# Patient Record
Sex: MALE | Race: BLACK OR AFRICAN AMERICAN | NOT HISPANIC OR LATINO | Employment: UNEMPLOYED | URBAN - NONMETROPOLITAN AREA
[De-identification: names, ages, dates, MRNs, and addresses within clinical notes are randomized per-mention and may not be internally consistent; named-entity substitution may affect disease eponyms.]

---

## 2018-06-21 ENCOUNTER — HISTORICAL (OUTPATIENT)
Dept: ADMINISTRATIVE | Facility: HOSPITAL | Age: 38
End: 2018-06-21

## 2021-05-20 LAB
ALBUMIN SERPL-MCNC: 4.5 G/DL (ref 3.4–5)
ALBUMIN/GLOB SERPL: 1.6 {RATIO}
ALP SERPL-CCNC: 121 U/L (ref 50–144)
ALT SERPL-CCNC: 66 U/L (ref 1–45)
ANION GAP SERPL CALC-SCNC: 11 MMOL/L (ref 7–16)
AST SERPL-CCNC: 40 U/L (ref 17–59)
BILIRUB SERPL-MCNC: 0.47 MG/DL (ref 0.1–1)
BUN SERPL-MCNC: 17 MG/DL (ref 7–20)
CALCIUM SERPL-MCNC: 9.6 MG/DL (ref 8.4–10.2)
CHLORIDE SERPL-SCNC: 106 MMOL/L (ref 94–110)
CO2 SERPL-SCNC: 25 MMOL/L (ref 21–32)
CREAT SERPL-MCNC: 0.9 MG/DL (ref 0.66–1.25)
CREAT/UREA NIT SERPL: 18.9 (ref 12–20)
EST. AVERAGE GLUCOSE BLD GHB EST-MCNC: 189 MG/DL (ref 70–115)
GLOBULIN SER-MCNC: 2.9 G/DL (ref 2–3.9)
GLUCOSE SERPL-MCNC: 131 MGM./DL (ref 70–115)
HBA1C MFR BLD: 8.3 % (ref 4–6)
POTASSIUM SERPL-SCNC: 4.1 MMOL/L (ref 3.5–5.1)
PROT SERPL-MCNC: 7.4 G/DL (ref 6.3–8.2)
SODIUM SERPL-SCNC: 142 MMOL/L (ref 135–145)

## 2021-05-24 LAB
CREAT UR-MCNC: 120.8 MG/DL
MICROALBUMIN/CREAT RATIO PNL UR: <10 (ref 0–29)

## 2021-08-18 LAB
ALBUMIN SERPL-MCNC: 4.8 G/DL (ref 3.4–5)
ALBUMIN/GLOB SERPL: 1.6 {RATIO}
ALP SERPL-CCNC: 141 U/L (ref 50–144)
ALT SERPL-CCNC: 84 U/L (ref 1–45)
ANION GAP SERPL CALC-SCNC: 12 MMOL/L (ref 7–16)
AST SERPL-CCNC: 45 U/L (ref 17–59)
BASOPHILS # BLD AUTO: 0.06 X10(3)/MCL (ref 0.01–0.08)
BASOPHILS NFR BLD AUTO: 0.8 % (ref 0.1–1.2)
BILIRUB SERPL-MCNC: 0.38 MG/DL (ref 0.1–1)
BUN SERPL-MCNC: 15 MG/DL (ref 7–20)
CALCIUM SERPL-MCNC: 10 MG/DL (ref 8.4–10.2)
CHLORIDE SERPL-SCNC: 108 MMOL/L (ref 94–110)
CHOLEST SERPL-MCNC: 143 MG/DL (ref 0–200)
CO2 SERPL-SCNC: 20 MMOL/L (ref 21–32)
CREAT SERPL-MCNC: 1.1 MG/DL (ref 0.66–1.25)
CREAT/UREA NIT SERPL: 13.6 (ref 12–20)
EOSINOPHIL # BLD AUTO: 0.16 X10(3)/MCL (ref 0.04–0.54)
EOSINOPHIL NFR BLD AUTO: 2.1 % (ref 0.7–7)
ERYTHROCYTE [DISTWIDTH] IN BLOOD BY AUTOMATED COUNT: 12.5 % (ref 11.6–14.4)
EST. AVERAGE GLUCOSE BLD GHB EST-MCNC: 159 MG/DL (ref 70–115)
GLOBULIN SER-MCNC: 3 G/DL (ref 2–3.9)
GLUCOSE SERPL-MCNC: 163 MGM./DL (ref 70–115)
HAV IGM SERPL QL IA: NONREACTIVE
HBA1C MFR BLD: 7.3 % (ref 4–6)
HBV CORE IGM SERPL QL IA: NONREACTIVE
HBV SURFACE AG SERPL QL IA: NONREACTIVE
HCT VFR BLD AUTO: 46.2 % (ref 36–52)
HCV AB SERPL QL IA: NONREACTIVE
HDLC SERPL-MCNC: 36 MG/DL (ref 40–60)
HGB BLD-MCNC: 15.3 G/DL (ref 13–18)
IMM GRANULOCYTES # BLD AUTO: 0.03 X10E3/UL (ref 0–0.03)
IMM GRANULOCYTES NFR BLD AUTO: 0.4 % (ref 0–0.5)
LDLC SERPL CALC-MCNC: 73.2 MG/DL (ref 30–100)
LYMPHOCYTES # BLD AUTO: 3.13 X10(3)/MCL (ref 1.32–3.57)
LYMPHOCYTES NFR BLD AUTO: 40.9 % (ref 20–55)
MCH RBC QN AUTO: 28.8 PG (ref 27–34)
MCHC RBC AUTO-ENTMCNC: 33.1 G/DL (ref 31–37)
MCV RBC AUTO: 87 FL (ref 79–99)
MONOCYTES # BLD AUTO: 0.61 X10(3)/MCL (ref 0.3–0.82)
MONOCYTES NFR BLD AUTO: 8 % (ref 4.7–12.5)
NEUTROPHILS # BLD AUTO: 3.67 X10(3)/MCL (ref 1.78–5.38)
NEUTROPHILS NFR BLD AUTO: 47.8 % (ref 37–73)
PLATELET # BLD AUTO: 264 X10(3)/MCL (ref 140–371)
PMV BLD AUTO: 11.3 FL (ref 9.4–12.4)
POTASSIUM SERPL-SCNC: 3.9 MMOL/L (ref 3.5–5.1)
PROT SERPL-MCNC: 7.8 G/DL (ref 6.3–8.2)
RBC # BLD AUTO: 5.31 X10(6)/MCL (ref 4–6)
SODIUM SERPL-SCNC: 140 MMOL/L (ref 135–145)
TRIGL SERPL-MCNC: 163 MG/DL (ref 30–200)
TSH SERPL-ACNC: 2.69 UIU/ML (ref 0.36–3.74)
WBC # SPEC AUTO: 7.7 X10(3)/MCL (ref 4–11.5)

## 2021-11-08 LAB
ALBUMIN SERPL-MCNC: 4.2 G/DL (ref 3.4–5)
ALBUMIN/GLOB SERPL: 1.4 {RATIO}
ALP SERPL-CCNC: 109 U/L (ref 50–144)
ALT SERPL-CCNC: 80 U/L (ref 1–45)
ANION GAP SERPL CALC-SCNC: 10 MMOL/L (ref 7–16)
AST SERPL-CCNC: 47 U/L (ref 17–59)
BILIRUB SERPL-MCNC: 0.4 MG/DL (ref 0–1)
BUN SERPL-MCNC: 14 MG/DL (ref 7–20)
CALCIUM SERPL-MCNC: 9.3 MG/DL (ref 8.4–10.2)
CHLORIDE SERPL-SCNC: 106 MMOL/L (ref 94–110)
CO2 SERPL-SCNC: 26 MMOL/L (ref 21–32)
CREAT SERPL-MCNC: 1.04 MG/DL (ref 0.66–1.25)
CREAT/UREA NIT SERPL: 13.5 (ref 12–20)
EST. AVERAGE GLUCOSE BLD GHB EST-MCNC: 168 MG/DL (ref 70–115)
GLOBULIN SER-MCNC: 2.9 G/DL (ref 2–3.9)
GLUCOSE SERPL-MCNC: 108 MGM./DL (ref 70–115)
HBA1C MFR BLD: 7.6 % (ref 4–6)
POTASSIUM SERPL-SCNC: 4 MMOL/L (ref 3.5–5.1)
PROT SERPL-MCNC: 7.1 G/DL (ref 6.3–8.2)
SODIUM SERPL-SCNC: 142 MMOL/L (ref 135–145)

## 2022-02-08 LAB
LEFT EYE DM RETINOPATHY: NEGATIVE
RIGHT EYE DM RETINOPATHY: NEGATIVE

## 2022-04-10 ENCOUNTER — HISTORICAL (OUTPATIENT)
Dept: ADMINISTRATIVE | Facility: HOSPITAL | Age: 42
End: 2022-04-10

## 2022-04-11 ENCOUNTER — HISTORICAL (OUTPATIENT)
Dept: ADMINISTRATIVE | Facility: HOSPITAL | Age: 42
End: 2022-04-11

## 2022-04-28 VITALS
OXYGEN SATURATION: 98 % | BODY MASS INDEX: 36.13 KG/M2 | SYSTOLIC BLOOD PRESSURE: 118 MMHG | DIASTOLIC BLOOD PRESSURE: 88 MMHG | WEIGHT: 230.19 LBS | HEIGHT: 67 IN

## 2022-04-28 VITALS
OXYGEN SATURATION: 98 % | WEIGHT: 230.19 LBS | DIASTOLIC BLOOD PRESSURE: 88 MMHG | SYSTOLIC BLOOD PRESSURE: 118 MMHG | BODY MASS INDEX: 36.13 KG/M2 | HEIGHT: 67 IN

## 2022-05-03 ENCOUNTER — HISTORICAL (OUTPATIENT)
Dept: ADMINISTRATIVE | Facility: HOSPITAL | Age: 42
End: 2022-05-03

## 2022-05-03 NOTE — HISTORICAL [facility]ER
This is a historical note converted from Eric. Formatting and pictures may have been removed.  Please reference Eric for original formatting and attached multimedia. Chief Complaint  med refills and f/u  History of Present Illness  40 year old  male patient of June Leone?with PMH type 2 diabetes, htn, migraines, back pain, high cholesterol here for follow up on lab work from Dec 2020 and refills of all medications.  Hypertension:?chronic intermittent?headaches?,?no?chest pain,?no?shortness of breath,?no?dizziness,?no?light headedness,?nopalpitations,?noedema, ??no?calf pain with exertion. Lifestyle:??not exercising regularly?,??not limiting salt intake  Medications:?taking medications as directed, ?no?side effects from medication, checks blood pressure at home . Patient reports bp at home sometimes runs 150s/90s.  Type?II?diabetes: home blood sugar range high ?150 fasting,?seeing eye doctor regularly?([facility]),?checking feet regularly,?not taking aspirin daily ,?taking statin, ?not missing doses of medications,?no side effects from medications,. Complications:?no kidney disease,?no diabetic retinopathy, no diabetic neuropathy,?no peripheral vascular disease,?no hypertension,?no hyperlipidemia,?no coronary artery disease.  Associated symptoms:?no change in weight,?no dizziness,?no sweats,?chronic intermittent?headaches,no confusion,?no increased thirst,?no increased appetite,?no increased urination,?no blurred vision, no numbness of feet,?no calluses on feet.?  Compliance:?compliant?with medications,?compliant?with follow-up visits,?noncompliant?with diet, compliant? with home glucose monitoring.?  ?  Patient reports migraines stable on topamax and prn nabumetome.? Denies worsening of headaches, increased frequency, waking in the middle of the night with migraines, changes in vision.  Review of Systems  General  Constitutional:?no fever,?no significant change in weight,?no  fatigue,?no night sweats, no exercise intolerance  ?  Skin  Skin:?no rash,?no skin lesions?,?no abnormal mole  ?  ENMT  Eyes:?no vision changes?,?no dry eyes,?no irritation,?no eye pain  Ears:?no difficulty hearing,?no ear pain,no ear discharge,  Nose:?no nose/sinus problems?,?no frequent nosebleeds,?no nasal congestion/discharge  Mouth/Throat:?no sore throat,?no bleeding gums,?no snoring,?no dry mouth,?no mouth ulcers,?no oral abnormalities,?no teeth problems  ?  Cardiovascular  Cardiovascular:?no chest pain,?no palpitations,?no edema?,?no shortness of breath when walking,no shortness of breath when lying down,?no light-headedness on standing,?no known heart murmur  ?  Respiratory  Respiratory:?no cough,?no wheezing,?no dyspnea?  ?  Gastrointestinal  GI:?no abdominal pain,?no nausea,?no vomiting,?no diarrhea,?no blood in stool,?no constipation?  ?  Genitourinary  Genitourinary:?no difficulty urinating,?no pain during urination (dysuria),??no increased frequency,no nocturia,?no urgency,?no incontinence  ?  Musculoskeletal  Musculoskeletal:?no muscle weakness,?no arthralgias/joint pain,?no muscle aches?  ?   Neurologic  Neurologic:?headaches,?no dizziness?,?no numbness,?no tingling (paresthesia)  ?  Endocrine  Endocrine:no temperature intolerance,?normal drinking,?no polyuria,  ?   Psychiatric  Psych:?no anxiety,?no depression,?no trouble sleeping  Physical Exam  Vitals & Measurements  T:?37? ?C (Temporal Artery)? HR:?88(Peripheral)? BP:?128/70? SpO2:?98%?  HT:?170.20?cm? WT:?103.300?kg? BMI:?35.66?  Constitutional  General Appearance:?well developed,?obese?  Level of Distress:?in no acute distress.  Ambulation:?ambulating normally?  ?   Psychiatric  Insight:?good judgement  Mental Status:?active and alert,?flat affect,?normal mood,  Orientation:?oriented to person, place and time  Memory:?recent memory?intact, remote memory?intact  ?  Head:?normocephalic?atraumatic  ?  Eyes  Lids and Conjunctivae:?non-injected,?no  discharge, ??no pallor,,  Pupils:?PERRLA  Sclerae:?non-icteric,?non-injected  ?  ENMT  Ears: ?no?lesions on external ear,?EACs clear?,?TMs clear w good landmarks,?  Hearing:?Conversation hearing appropriate  Nose:?no?lesions on external nose,?nares patent,?no sinus tenderness,?no nasal discharge  Lips, Teeth, and Gums: ?no?mouth or lip ulcers,?no bleeding gums,?normal dentition,?no?gingival erythema.  Oropharynx:?moist mucous membranes,?noerythema,?no?exudates,??+1 tonsillar enlargement.  ?   Neck:  Neck: supple,?? trachea midline  Lymph:?no?lymphadenopathy  Thyroid:no enlargement,?non-tender,?no nodules.  ?   Lungs  Respiratory effort:?no dyspnea,?  Auscultation:?breath sounds normal,?good air movement,? ?clear to auscultation bilaterally.  ?   Cardiovascular  Heart Auscultation:?regular rate,?regular?rhythm,  Pulses:?strong / equal  ?  Abdomen  Bowel Sounds:?normal  Inspection and Palpation:?soft,?non-distended,?no?tenderness,?  ?  Musculoskeletal:  Joints, Bones, and Muscles:?normal movement of all extremities,??  Extremities:no cyanosis,??no edema,?no varicosities , no palpable cord.  ?  Skin  Inspection and palpation:?no rash, good turgor, no lesion,?no jaundice  Nails:?normal  ?  Neurologic  Gait and Station:?normal gait,?normal station  Cranial Nerves:?grossly intact  Sensation:grossly intact,?  ?  ?  Assessment/Plan  1.?Diabetes mellitus ? E11.9  ?5/20/2020 hemoglobin A1c 7.1...12/8/20 hba1c 7.5 , patient currently taking?Jardiance 10 mg daily, Lantus 18 units?daily and Metformin?ER?500 mg daily.? Increase Jardiance to 25 mg daily?for?diabetes control as well as reports of elevated BP at home. ?Add baby aspirin?daily. ?Educated patient on need to monitor glucose and notify office of any low sugars below 80?as well?as importance of remaining well-hydrated. ?Patient states understanding. ?Obtain lab work in 3 months?and then follow-up afterwards.  2.?Hyperlipidemia ? E78.5  5/22/2020 cholesterol 120 HDL 32,  LDL 51, triglycerides 184, LFT WNL.? 12/8/2020 cholesterol 118, HDL 35, triglyceride 118, LDL 55, AST WNL, ALT elevated 63.? Patient currently taking atorvastatin 40 mg daily  3.?Hypertensive disorder ? I10  ?BP?within normal limits today,?patient currently take metoprolol 50 mg?daily.? Patient does report elevated BPs at home?of 150s/90s.? Will increase Jardiance?and monitor  4.?Influenza vaccination given ? Z23  5.?Elevated liver enzymes ? R74.8  ?5/22/2020?LFT WNL. ?12/8/2020?AST WNL, ALT?elevated 63.??Discussed low-fat diet. ?Repeat level drawn in office today. ?Will notify patient of results next week. ?If remains elevated will add?hepatitis panel and liver ultrasound?to upcoming?orders.  6.?Migraine ? G43.909  Stable on Topamax 50 mg?at bedtime and as needed?nabumetone.  Orders:  aspirin, 81 mg = 1 tab(s), Oral, Daily, # 30 tab(s), 5 Refill(s), Pharmacy: [facility], 170.2, cm, Height/Length Dosing, 02/05/21 9:49:00 CST, 103.3, kg, Weight Dosing, 02/05/21 9:49:00 CST  atorvastatin, 40 mg = 1 tab(s), Oral, qPM, # 30 tab(s), 5 Refill(s), Pharmacy: [facility], 170.2, cm, Height/Length Dosing, 02/05/21 9:49:00 CST, 103.3, kg, Weight Dosing, 02/05/21 9:49:00 CST  cyclobenzaprine, 7.5 mg = 1 tab(s), Oral, q8hr, PRN PRN for spasm, # 30 tab(s), 1 Refill(s), Pharmacy: [facility], 170.2, cm, Height/Length Dosing, 02/05/21 9:49:00 CST, 103.3, kg, Weight Dosing, 02/05/21 9:49:00 CST  empagliflozin, 25 mg = 1 tab(s), Oral, qAM, # 30 tab(s), 5 Refill(s), Pharmacy: [facility], 170.2, cm, Height/Length Dosing, 02/05/21 9:49:00 CST, 103.3, kg, Weight Dosing, 02/05/21 9:49:00 CST  insulin glargine, 18 units, Subcutaneous, Daily, # 6 mL, 5 Refill(s), Pharmacy: [facility], 170.2, cm, Height/Length Dosing, 02/05/21 9:49:00 CST, 103.3, kg, Weight Dosing, 02/05/21 9:49:00 CST  metFORMIN, 500 mg = 1 tab(s), Oral, Daily, # 30 tab(s), 5 Refill(s), Pharmacy: [facility], 170.2, cm, Height/Length  Dosing, 02/05/21 9:49:00 CST, 103.3, kg, Weight Dosing, 02/05/21 9:49:00 CST  metoprolol, 50 mg = 1 tab(s), Oral, Daily, # 30 tab(s), 5 Refill(s), Pharmacy: [facility], 170.2, cm, Height/Length Dosing, 02/05/21 9:49:00 CST, 103.3, kg, Weight Dosing, 02/05/21 9:49:00 CST  nabumetone, 750 mg = 1 tab(s), Oral, BID, # 60 tab(s), 2 Refill(s), Pharmacy: [facility], 170.2, cm, Height/Length Dosing, 02/05/21 9:49:00 CST, 103.3, kg, Weight Dosing, 02/05/21 9:49:00 CST  topiramate, 50 mg = 1 tab(s), Oral, qPM, # 30 tab(s), 5 Refill(s), Pharmacy: [facility], 170.2, cm, Height/Length Dosing, 02/05/21 9:49:00 CST, 103.3, kg, Weight Dosing, 02/05/21 9:49:00 CST  Clinic Follow Up Established 15, *Est. 05/09/21 3:00:00 CDT, Order for future visit, Diabetes mellitus  Hyperlipidemia  Hypertensive disorder  [facility]  Clinic Follow-Up Lab Draw, *Est. 05/09/21 3:00:00 CDT, Order for future visit, Diabetes mellitus  Hyperlipidemia  Hypertensive disorder  Elevated liver enzymes, [facility]  Office/Outpatient Visit Level 4 Established [id number] PC, Diabetes mellitus  Hyperlipidemia  Hypertensive disorder  Influenza vaccination given  Elevated liver enzymes  Migraine, [facility], 02/05/21 10:29:00 CST  Referrals  Clinic Follow up, Order for future visit  Clinic Follow Up Established 15, *Est. 05/09/21 3:00:00 CDT, Order for future visit, Diabetes mellitus  Hyperlipidemia  Hypertensive disorder  Elevated liver enzymes, [facility]  Clinic Follow-Up Lab Draw, *Est. 05/09/21 3:00:00 CDT, Order for future visit, Diabetes mellitus  Hyperlipidemia  Hypertensive disorder  Elevated liver enzymes, [facility]   Problem List/Past Medical History  Ongoing  Diabetes mellitus  Elevated liver enzymes  Hyperlipidemia  Hypertensive disorder  Migraine  Historical  Chest pain  GERD -  Gastro-esophageal reflux disease  Hypercholesterolaemia  Medications  aspirin 81 mg oral Delayed Release (EC) tablet, 81 mg= 1 tab(s), Oral, Daily, 5 refills  atorvastatin 40 mg oral tablet, 40 mg= 1 tab(s), Oral, qPM, 5 refills  cyclobenzaprine 7.5 mg oral tablet, 7.5 mg= 1 tab(s), Oral, q8hr, PRN, 1 refills  Jardiance 25 mg oral tablet, 25 mg= 1 tab(s), Oral, qAM, 5 refills  Lantus Solostar Pen 100 units/mL subcutaneous solution, 18 units, Subcutaneous, Daily, 5 refills  metFORMIN 500 mg oral tablet, extended release, 500 mg= 1 tab(s), Oral, Daily, 5 refills  metoprolol succinate 50 mg oral tablet extended release, 50 mg= 1 tab(s), Oral, Daily, 5 refills  nabumetone 750 mg oral tablet, 750 mg= 1 tab(s), Oral, BID, 2 refills  topiramate 50 mg oral tablet, 50 mg= 1 tab(s), Oral, qPM, 5 refills  Allergies  No Known Medication Allergies  Social History  Abuse/Neglect  No, 12/16/2020  Alcohol  Current, Liquor, 1-2 times per month, Alcohol use interferes with work or home: No. Others hurt by drinking: No. Household alcohol concerns: No., 02/05/2021  Employment/School  Highest education level: High school., 01/02/2020  Exercise  Exercise duration: 0., 02/05/2021  Home/Environment  Lives with Mother., 02/05/2021  Nutrition/Health  Regular, 02/05/2021  Substance Use  Never, 02/05/2021  Tobacco  5-9 cigarettes (between 1/4 to 1/2 pack)/day in last 30 days, Cigars, No, Household tobacco concerns: No. Smokeless Tobacco Use: Never. 14 Years (Age started)., 02/05/2021  Family History  Cerebrovascular accident: Sister.  Hyperlipidemia.: Father.  Hypertension.: Mother and Father.  Immunizations  Vaccine Date Status   influenza virus vaccine, inactivated 02/05/2021 Given   Health Maintenance  Health Maintenance  ???Pending?(in the next year)  ??? ??OverDue  ??? ? ? ?Influenza Vaccine due??10/01/20??and every 1??day(s)  ??? ? ? ?Diabetes Maintenance-HgbA1c due??10/08/20??and every 1??year(s)  ??? ? ? ?Hypertension Management-BMP  due??10/08/20??and every 1??year(s)  ??? ? ? ?Smoking Cessation due??01/01/21??Variable frequency  ??? ??Due?  ??? ? ? ?Alcohol Misuse Screening due??01/02/21??and every 1??year(s)  ??? ? ? ?ADL Screening due??02/05/21??and every 1??year(s)  ??? ? ? ?Diabetes Maintenance-Serum Creatinine due??02/05/21??Variable frequency  ??? ? ? ?Diabetes Maintenance-Fasting Lipid Profile due??02/05/21??Variable frequency  ??? ? ? ?Diabetes Maintenance-Eye Exam due??02/05/21??Unknown Frequency  ??? ? ? ?Diabetes Maintenance-Foot Exam due??02/05/21??Unknown Frequency  ??? ? ? ?Diabetes Maintenance-Microalbumin due??02/05/21??Unknown Frequency  ??? ? ? ?Hypertension Management-Education due??02/05/21??and every 1??year(s)  ??? ? ? ?Tetanus Vaccine due??02/05/21??and every 10??year(s)  ??? ??Due In Future?  ??? ? ? ?Obesity Screening not due until??01/01/22??and every 1??year(s)  ???Satisfied?(in the past 1 year)  ??? ??Satisfied?  ??? ? ? ?Blood Pressure Screening on??02/05/21.??Satisfied by Desai LPN, Piquela P.  ??? ? ? ?Body Mass Index Check on??02/05/21.??Satisfied by Desai LPN, Piquela P.  ??? ? ? ?Depression Screening on??02/05/21.??Satisfied by Desai LPN, Piquela P.  ??? ? ? ?Hypertension Management-Blood Pressure on??02/05/21.??Satisfied by Desai LPN, Piquela P.  ??? ? ? ?Influenza Vaccine on??02/05/21.??Satisfied by Desai LPN, Piquela P.  ??? ? ? ?Obesity Screening on??02/05/21.??Satisfied by Coleen Desai LPN  ?

## 2023-02-17 RX ORDER — NABUMETONE 750 MG/1
750 TABLET, FILM COATED ORAL 2 TIMES DAILY
COMMUNITY
End: 2023-05-19 | Stop reason: SDUPTHER

## 2023-02-17 RX ORDER — METFORMIN HYDROCHLORIDE 500 MG/1
500 TABLET ORAL 2 TIMES DAILY WITH MEALS
COMMUNITY
End: 2023-02-22 | Stop reason: DRUGHIGH

## 2023-02-17 RX ORDER — BLOOD SUGAR DIAGNOSTIC
1 STRIP MISCELLANEOUS
COMMUNITY
End: 2023-04-24 | Stop reason: SDUPTHER

## 2023-02-17 RX ORDER — CYCLOBENZAPRINE HYDROCHLORIDE 7.5 MG/1
10 TABLET, FILM COATED ORAL 3 TIMES DAILY PRN
COMMUNITY
End: 2024-02-14 | Stop reason: SDUPTHER

## 2023-02-17 RX ORDER — DULAGLUTIDE 1.5 MG/.5ML
1.5 INJECTION, SOLUTION SUBCUTANEOUS
COMMUNITY
End: 2023-02-22 | Stop reason: SDUPTHER

## 2023-02-17 RX ORDER — ASPIRIN 81 MG/1
81 TABLET ORAL DAILY
COMMUNITY
End: 2023-05-19

## 2023-02-17 RX ORDER — INSULIN GLARGINE 100 [IU]/ML
24 INJECTION, SOLUTION SUBCUTANEOUS DAILY
COMMUNITY
End: 2023-03-02 | Stop reason: SDUPTHER

## 2023-02-17 RX ORDER — TOPIRAMATE 50 MG/1
50 TABLET, FILM COATED ORAL DAILY
COMMUNITY
End: 2023-02-22 | Stop reason: SDUPTHER

## 2023-02-17 RX ORDER — METOPROLOL SUCCINATE 50 MG/1
50 TABLET, EXTENDED RELEASE ORAL DAILY
COMMUNITY
End: 2023-02-22 | Stop reason: SDUPTHER

## 2023-02-17 RX ORDER — ATORVASTATIN CALCIUM 40 MG/1
40 TABLET, FILM COATED ORAL DAILY
COMMUNITY
End: 2023-02-22 | Stop reason: SDUPTHER

## 2023-02-22 ENCOUNTER — OFFICE VISIT (OUTPATIENT)
Dept: FAMILY MEDICINE | Facility: CLINIC | Age: 43
End: 2023-02-22
Payer: MEDICAID

## 2023-02-22 VITALS
TEMPERATURE: 98 F | OXYGEN SATURATION: 99 % | DIASTOLIC BLOOD PRESSURE: 92 MMHG | SYSTOLIC BLOOD PRESSURE: 138 MMHG | HEIGHT: 67 IN | BODY MASS INDEX: 34.39 KG/M2 | WEIGHT: 219.13 LBS | HEART RATE: 100 BPM

## 2023-02-22 DIAGNOSIS — E11.9 TYPE 2 DIABETES MELLITUS WITHOUT COMPLICATION, WITH LONG-TERM CURRENT USE OF INSULIN: Primary | ICD-10-CM

## 2023-02-22 DIAGNOSIS — Z82.49 FAMILY HISTORY OF CARDIOVASCULAR DISEASE: ICD-10-CM

## 2023-02-22 DIAGNOSIS — R74.8 ELEVATED LIVER ENZYMES: ICD-10-CM

## 2023-02-22 DIAGNOSIS — M54.50 CHRONIC LOW BACK PAIN, UNSPECIFIED BACK PAIN LATERALITY, UNSPECIFIED WHETHER SCIATICA PRESENT: ICD-10-CM

## 2023-02-22 DIAGNOSIS — I10 PRIMARY HYPERTENSION: ICD-10-CM

## 2023-02-22 DIAGNOSIS — Z79.4 TYPE 2 DIABETES MELLITUS WITHOUT COMPLICATION, WITH LONG-TERM CURRENT USE OF INSULIN: Primary | ICD-10-CM

## 2023-02-22 DIAGNOSIS — E66.9 OBESITY, UNSPECIFIED CLASSIFICATION, UNSPECIFIED OBESITY TYPE, UNSPECIFIED WHETHER SERIOUS COMORBIDITY PRESENT: ICD-10-CM

## 2023-02-22 DIAGNOSIS — E78.5 HYPERLIPIDEMIA, UNSPECIFIED HYPERLIPIDEMIA TYPE: ICD-10-CM

## 2023-02-22 DIAGNOSIS — G89.29 CHRONIC LOW BACK PAIN, UNSPECIFIED BACK PAIN LATERALITY, UNSPECIFIED WHETHER SCIATICA PRESENT: ICD-10-CM

## 2023-02-22 DIAGNOSIS — G43.809 OTHER MIGRAINE WITHOUT STATUS MIGRAINOSUS, NOT INTRACTABLE: ICD-10-CM

## 2023-02-22 DIAGNOSIS — M79.604 PAIN OF RIGHT LOWER EXTREMITY: ICD-10-CM

## 2023-02-22 PROBLEM — G43.909 MIGRAINE HEADACHE: Status: ACTIVE | Noted: 2019-09-27

## 2023-02-22 LAB
ALBUMIN SERPL-MCNC: 4.8 G/DL (ref 3.4–5)
ALBUMIN/GLOB SERPL: 1.5 RATIO
ALP SERPL-CCNC: 137 UNIT/L (ref 50–144)
ALT SERPL-CCNC: 56 UNIT/L (ref 1–45)
ANION GAP SERPL CALC-SCNC: 10 MEQ/L (ref 2–13)
AST SERPL-CCNC: 40 UNIT/L (ref 17–59)
BASOPHILS # BLD AUTO: 0.07 X10(3)/MCL (ref 0.01–0.08)
BASOPHILS NFR BLD AUTO: 1 % (ref 0.1–1.2)
BILIRUBIN DIRECT+TOT PNL SERPL-MCNC: 0.3 MG/DL (ref 0–1)
BUN SERPL-MCNC: 15 MG/DL (ref 7–20)
CALCIUM SERPL-MCNC: 9.6 MG/DL (ref 8.4–10.2)
CHLORIDE SERPL-SCNC: 100 MMOL/L (ref 98–110)
CHOLEST SERPL-MCNC: 263 MG/DL (ref 0–200)
CO2 SERPL-SCNC: 27 MMOL/L (ref 21–32)
CREAT SERPL-MCNC: 0.91 MG/DL (ref 0.66–1.25)
CREAT/UREA NIT SERPL: 16 (ref 12–20)
EOSINOPHIL # BLD AUTO: 0.17 X10(3)/MCL (ref 0.04–0.54)
EOSINOPHIL NFR BLD AUTO: 2.3 % (ref 0.7–7)
ERYTHROCYTE [DISTWIDTH] IN BLOOD BY AUTOMATED COUNT: 12 % (ref 11.6–14.4)
EST. AVERAGE GLUCOSE BLD GHB EST-MCNC: 269 MG/DL (ref 70–115)
GFR SERPLBLD CREATININE-BSD FMLA CKD-EPI: >90 MLS/MIN/1.73/M2
GLOBULIN SER-MCNC: 3.3 GM/DL (ref 2–3.9)
GLUCOSE SERPL-MCNC: 371 MG/DL (ref 70–115)
HBA1C MFR BLD: 11 % (ref 4–6)
HCT VFR BLD AUTO: 49 % (ref 36–52)
HDLC SERPL-MCNC: 32 MG/DL (ref 40–60)
HGB BLD-MCNC: 16.6 G/DL (ref 13–18)
IMM GRANULOCYTES # BLD AUTO: 0.02 X10(3)/MCL (ref 0–0.03)
IMM GRANULOCYTES NFR BLD AUTO: 0.3 % (ref 0–0.5)
LDLC SERPL DIRECT ASSAY-SCNC: 95.6 MG/DL (ref 30–100)
LYMPHOCYTES # BLD AUTO: 2.62 X10(3)/MCL (ref 1.32–3.57)
LYMPHOCYTES NFR BLD AUTO: 35.8 % (ref 20–55)
MCH RBC QN AUTO: 28.8 PG (ref 27–34)
MCV RBC AUTO: 85.1 FL (ref 79–99)
MEAN CELL HEMOGLOBIN CONCENTRATION (OHS) G/DL: 33.9 G/DL (ref 31–37)
MONOCYTES # BLD AUTO: 0.62 X10(3)/MCL (ref 0.3–0.82)
MONOCYTES NFR BLD AUTO: 8.5 % (ref 4.7–12.5)
NEUTROPHILS # BLD AUTO: 3.81 X10(3)/MCL (ref 1.78–5.38)
NEUTROPHILS NFR BLD AUTO: 52.1 % (ref 37–73)
NRBC BLD AUTO-RTO: 0 % (ref 0–1)
PLATELET # BLD AUTO: 245 X10(3)/MCL (ref 140–371)
PMV BLD AUTO: 11.8 FL (ref 9.4–12.4)
POTASSIUM SERPL-SCNC: 4.8 MMOL/L (ref 3.5–5.1)
PROT SERPL-MCNC: 8.1 GM/DL (ref 6.3–8.2)
RBC # BLD AUTO: 5.76 X10(6)/MCL (ref 4–6)
SODIUM SERPL-SCNC: 137 MMOL/L (ref 135–145)
TRIGL SERPL-MCNC: 1069 MG/DL (ref 30–200)
TSH SERPL-ACNC: 2.18 UIU/ML (ref 0.36–3.74)
WBC # SPEC AUTO: 7.3 X10(3)/MCL (ref 4–11.5)

## 2023-02-22 PROCEDURE — 1159F MED LIST DOCD IN RCRD: CPT | Mod: CPTII,,, | Performed by: NURSE PRACTITIONER

## 2023-02-22 PROCEDURE — 4010F ACE/ARB THERAPY RXD/TAKEN: CPT | Mod: CPTII,,, | Performed by: NURSE PRACTITIONER

## 2023-02-22 PROCEDURE — 84443 ASSAY THYROID STIM HORMONE: CPT | Performed by: NURSE PRACTITIONER

## 2023-02-22 PROCEDURE — 1160F RVW MEDS BY RX/DR IN RCRD: CPT | Mod: CPTII,,, | Performed by: NURSE PRACTITIONER

## 2023-02-22 PROCEDURE — 3008F BODY MASS INDEX DOCD: CPT | Mod: CPTII,,, | Performed by: NURSE PRACTITIONER

## 2023-02-22 PROCEDURE — 3075F SYST BP GE 130 - 139MM HG: CPT | Mod: CPTII,,, | Performed by: NURSE PRACTITIONER

## 2023-02-22 PROCEDURE — 3075F PR MOST RECENT SYSTOLIC BLOOD PRESS GE 130-139MM HG: ICD-10-PCS | Mod: CPTII,,, | Performed by: NURSE PRACTITIONER

## 2023-02-22 PROCEDURE — 80061 LIPID PANEL: CPT | Performed by: NURSE PRACTITIONER

## 2023-02-22 PROCEDURE — 99214 PR OFFICE/OUTPT VISIT, EST, LEVL IV, 30-39 MIN: ICD-10-PCS | Mod: ,,, | Performed by: NURSE PRACTITIONER

## 2023-02-22 PROCEDURE — 99214 OFFICE O/P EST MOD 30 MIN: CPT | Mod: ,,, | Performed by: NURSE PRACTITIONER

## 2023-02-22 PROCEDURE — 3080F DIAST BP >= 90 MM HG: CPT | Mod: CPTII,,, | Performed by: NURSE PRACTITIONER

## 2023-02-22 PROCEDURE — 3008F PR BODY MASS INDEX (BMI) DOCUMENTED: ICD-10-PCS | Mod: CPTII,,, | Performed by: NURSE PRACTITIONER

## 2023-02-22 PROCEDURE — 4010F PR ACE/ARB THEARPY RXD/TAKEN: ICD-10-PCS | Mod: CPTII,,, | Performed by: NURSE PRACTITIONER

## 2023-02-22 PROCEDURE — 83036 HEMOGLOBIN GLYCOSYLATED A1C: CPT | Performed by: NURSE PRACTITIONER

## 2023-02-22 PROCEDURE — 85025 COMPLETE CBC W/AUTO DIFF WBC: CPT | Performed by: NURSE PRACTITIONER

## 2023-02-22 PROCEDURE — 1160F PR REVIEW ALL MEDS BY PRESCRIBER/CLIN PHARMACIST DOCUMENTED: ICD-10-PCS | Mod: CPTII,,, | Performed by: NURSE PRACTITIONER

## 2023-02-22 PROCEDURE — 1159F PR MEDICATION LIST DOCUMENTED IN MEDICAL RECORD: ICD-10-PCS | Mod: CPTII,,, | Performed by: NURSE PRACTITIONER

## 2023-02-22 PROCEDURE — 3080F PR MOST RECENT DIASTOLIC BLOOD PRESSURE >= 90 MM HG: ICD-10-PCS | Mod: CPTII,,, | Performed by: NURSE PRACTITIONER

## 2023-02-22 PROCEDURE — 80053 COMPREHEN METABOLIC PANEL: CPT | Performed by: NURSE PRACTITIONER

## 2023-02-22 RX ORDER — IRBESARTAN 150 MG/1
150 TABLET ORAL NIGHTLY
Qty: 90 TABLET | Refills: 0 | Status: SHIPPED | OUTPATIENT
Start: 2023-02-22 | End: 2023-05-30 | Stop reason: SDUPTHER

## 2023-02-22 RX ORDER — ATORVASTATIN CALCIUM 40 MG/1
40 TABLET, FILM COATED ORAL DAILY
Qty: 90 TABLET | Refills: 0 | Status: SHIPPED | OUTPATIENT
Start: 2023-02-22 | End: 2023-05-30 | Stop reason: SDUPTHER

## 2023-02-22 RX ORDER — METOPROLOL SUCCINATE 50 MG/1
50 TABLET, EXTENDED RELEASE ORAL DAILY
Qty: 90 TABLET | Refills: 0 | Status: SHIPPED | OUTPATIENT
Start: 2023-02-22 | End: 2023-05-30 | Stop reason: SDUPTHER

## 2023-02-22 RX ORDER — METFORMIN HYDROCHLORIDE 500 MG/1
1000 TABLET, EXTENDED RELEASE ORAL NIGHTLY
Qty: 180 TABLET | Refills: 0 | Status: SHIPPED | OUTPATIENT
Start: 2023-02-22 | End: 2023-05-30 | Stop reason: SDUPTHER

## 2023-02-22 RX ORDER — TOPIRAMATE 50 MG/1
50 TABLET, FILM COATED ORAL DAILY
Qty: 90 TABLET | Refills: 0 | Status: SHIPPED | OUTPATIENT
Start: 2023-02-22 | End: 2023-05-30 | Stop reason: SDUPTHER

## 2023-02-22 RX ORDER — DULAGLUTIDE 1.5 MG/.5ML
1.5 INJECTION, SOLUTION SUBCUTANEOUS
Qty: 12 PEN | Refills: 0 | Status: SHIPPED | OUTPATIENT
Start: 2023-02-22 | End: 2023-04-14 | Stop reason: SDUPTHER

## 2023-02-22 NOTE — PROGRESS NOTES
Patient ID: [id number]     Chief Complaint: lower back, r hip, l arm pain      HPI:     Ysabel Valerio is a 42 y.o. male here today for c/o low back, right hip, and left arm pain.  He's been lost to follow up since 21.  He states that his mother  over a year ago and he has been off of his routine medications for a while now.  He is not checking his blood sugar or blood pressure at home.  He does not follow any specific diet.  Pain he is having in his lower back, left arm, right leg have been present for years but he states getting worse.  He was referred to PT twice but never received a phone call to schedule.  He has had an EMG many years ago while living in [place].  He can't recall results. He does report having a murmur and that he saw a cardiologist when he lived in [place].  He does state that he has chest pain and has had chest pain since he was a child.  He reports that his sister had a stroke before the age of 50.    Past Medical History:  has a past medical history of Chest pain, Diabetes mellitus, type 2, Elevated liver enzymes, GERD (gastroesophageal reflux disease), Hyperlipidemia, Hypertension, Low back pain, Migraine, Obesity, unspecified, and Right leg pain.    Surgical History:  has no past surgical history on file.    Family History: family history includes Hyperlipidemia in his father; Hypertension in his father and mother; Stroke in his sister.    Social History:  reports that he has been smoking cigarettes. He has a 3.75 pack-year smoking history. He has been exposed to tobacco smoke. He has never used smokeless tobacco. He reports current alcohol use. He reports that he does not use drugs.    Current Outpatient Medications   Medication Instructions    aspirin (ECOTRIN) 81 mg, Oral, Daily    atorvastatin (LIPITOR) 40 mg, Oral, Daily    blood sugar diagnostic Strp 1 each, Misc.(Non-Drug; Combo Route), 2 times daily    cyclobenzaprine (FEXMID) 10 mg, Oral, 3 times daily PRN     "empagliflozin (JARDIANCE) 25 mg, Oral, Daily    insulin 24 Units, Subcutaneous, Daily    irbesartan (AVAPRO) 150 mg, Oral, Nightly    metFORMIN (GLUCOPHAGE-XR) 1,000 mg, Oral, Nightly    metoprolol succinate (TOPROL-XL) 50 mg, Oral, Daily    nabumetone (RELAFEN) 750 mg, Oral, 2 times daily    pen needle, diabetic 32 gauge x 3/16" Ndle 1 each, Misc.(Non-Drug; Combo Route)    topiramate (TOPAMAX) 50 mg, Oral, Daily    TRULICITY 1.5 mg, Subcutaneous, Every 7 days       Patient has No Known Allergies.     Patient Care Team:  SUNDEEP Salazar as PCP - General (Family Medicine)       Subjective:     Review of Systems    See HPI     Objective:     Visit Vitals  BP (!) 138/92 (BP Location: Left arm, Patient Position: Sitting)   Pulse 100   Temp 98.4 °F (36.9 °C) (Temporal)   Ht 5' 7.01" (1.702 m)   Wt 99.4 kg (219 lb 2.2 oz)   SpO2 99%   BMI 34.31 kg/m²       Physical Exam  Constitutional:       Appearance: He is obese.   HENT:      Head: Normocephalic.      Nose: Nose normal.      Mouth/Throat:      Mouth: Mucous membranes are moist.      Pharynx: Oropharynx is clear.   Cardiovascular:      Rate and Rhythm: Normal rate and regular rhythm.      Pulses: Normal pulses.      Heart sounds: Normal heart sounds.   Pulmonary:      Effort: Pulmonary effort is normal.      Breath sounds: Normal breath sounds.   Musculoskeletal:      Right lower leg: No edema.      Left lower leg: No edema.   Skin:     General: Skin is warm and dry.   Neurological:      General: No focal deficit present.      Mental Status: He is alert. Mental status is at baseline.   Psychiatric:         Mood and Affect: Mood normal.         Behavior: Behavior normal.       Assessment:       ICD-10-CM ICD-9-CM   1. Type 2 diabetes mellitus without complication, with long-term current use of insulin  E11.9 250.00    Z79.4 V58.67   2. Primary hypertension  I10 401.9   3. Hyperlipidemia, unspecified hyperlipidemia type  E78.5 272.4   4. Other migraine without " status migrainosus, not intractable  G43.809 346.80   5. Obesity, unspecified classification, unspecified obesity type, unspecified whether serious comorbidity present  E66.9 278.00   6. Elevated liver enzymes  R74.8 790.5   7. Chronic low back pain, unspecified back pain laterality, unspecified whether sciatica present  M54.50 724.2    G89.29 338.29   8. Pain of right lower extremity  M79.604 729.5   9. Family history of cardiovascular disease  Z82.49 V17.49        Plan:     Get labs today, will call with updated POC.  Referring for PT  Referring to neurology for EMG  Referring to cardiology to establish care for numerous risk factors  Check labs again in 3 months and RTC for f/u pain and chronic diseases  Ok to restart the following medications:    Medications Ordered This Encounter   Medications    atorvastatin (LIPITOR) 40 MG tablet     Sig: Take 1 tablet (40 mg total) by mouth once daily.     Dispense:  90 tablet     Refill:  0    dulaglutide (TRULICITY) 1.5 mg/0.5 mL pen injector     Sig: Inject 1.5 mg into the skin every 7 days.     Dispense:  12 pen     Refill:  0    irbesartan (AVAPRO) 150 MG tablet     Sig: Take 1 tablet (150 mg total) by mouth every evening.     Dispense:  90 tablet     Refill:  0     .    metFORMIN (GLUCOPHAGE-XR) 500 MG ER 24hr tablet     Sig: Take 2 tablets (1,000 mg total) by mouth every evening.     Dispense:  180 tablet     Refill:  0    metoprolol succinate (TOPROL-XL) 50 MG 24 hr tablet     Sig: Take 1 tablet (50 mg total) by mouth once daily.     Dispense:  90 tablet     Refill:  0     .    topiramate (TOPAMAX) 50 MG tablet     Sig: Take 1 tablet (50 mg total) by mouth once daily.     Dispense:  90 tablet     Refill:  0        Follow up in about 3 months (around 5/22/2023) for Follow Up, Labs Prior. In addition to their scheduled follow up, the patient has also been instructed to follow up on as needed basis.     Future Appointments   Date Time Provider Department Center    5/11/2023  8:20 AM LAB, [facility] LABORATORY DRAW STATION [facility] MELANIE Brower   5/18/2023  1:30 PM SUNDEEP Salzaar [facility] SUNDEEP Mack

## 2023-03-02 ENCOUNTER — TELEPHONE (OUTPATIENT)
Dept: FAMILY MEDICINE | Facility: CLINIC | Age: 43
End: 2023-03-02
Payer: MEDICAID

## 2023-03-02 DIAGNOSIS — E11.9 TYPE 2 DIABETES MELLITUS WITHOUT COMPLICATION, WITH LONG-TERM CURRENT USE OF INSULIN: Primary | ICD-10-CM

## 2023-03-02 DIAGNOSIS — Z79.4 TYPE 2 DIABETES MELLITUS WITHOUT COMPLICATION, WITH LONG-TERM CURRENT USE OF INSULIN: Primary | ICD-10-CM

## 2023-03-02 RX ORDER — INSULIN GLARGINE 100 [IU]/ML
INJECTION, SOLUTION SUBCUTANEOUS
Qty: 3 ML | Refills: 2 | Status: SHIPPED | OUTPATIENT
Start: 2023-03-02 | End: 2023-03-06 | Stop reason: SDUPTHER

## 2023-03-02 NOTE — TELEPHONE ENCOUNTER
----- Message from JUANCARLOS Salazar-JOHANNA sent at 3/2/2023  1:47 PM CST -----  Please inform patient of results.    Hemoglobin A1c is 11.  He needs to restart his long-acting insulin which I will be sending today.  He should start with 15 units every night and increase by 2 units every 3 days that his fasting blood sugar is greater than 150.  He can not restart his Jardiance at this time.  He is at high risk for pancreatitis due to his elevated triglycerides.  Please stress the importance of medication compliance.  He needs to follow up in 1 month with his blood sugar log.  I would like him testing at least once a day fasting.

## 2023-03-06 ENCOUNTER — TELEPHONE (OUTPATIENT)
Dept: FAMILY MEDICINE | Facility: CLINIC | Age: 43
End: 2023-03-06
Payer: MEDICAID

## 2023-03-06 DIAGNOSIS — Z79.4 TYPE 2 DIABETES MELLITUS WITHOUT COMPLICATION, WITH LONG-TERM CURRENT USE OF INSULIN: ICD-10-CM

## 2023-03-06 DIAGNOSIS — E11.9 TYPE 2 DIABETES MELLITUS WITHOUT COMPLICATION, WITH LONG-TERM CURRENT USE OF INSULIN: ICD-10-CM

## 2023-03-06 RX ORDER — INSULIN GLARGINE 100 [IU]/ML
INJECTION, SOLUTION SUBCUTANEOUS
Qty: 3 ML | Refills: 2 | Status: SHIPPED | OUTPATIENT
Start: 2023-03-06 | End: 2023-06-21 | Stop reason: SDUPTHER

## 2023-03-06 NOTE — TELEPHONE ENCOUNTER
Spoke with patient and he stated that insulin was on back order so I sent it to [facility] per his request. Also will make 1 month F/U appointment.

## 2023-04-14 ENCOUNTER — TELEPHONE (OUTPATIENT)
Dept: FAMILY MEDICINE | Facility: CLINIC | Age: 43
End: 2023-04-14

## 2023-04-14 ENCOUNTER — OFFICE VISIT (OUTPATIENT)
Dept: FAMILY MEDICINE | Facility: CLINIC | Age: 43
End: 2023-04-14
Payer: MEDICAID

## 2023-04-14 VITALS
DIASTOLIC BLOOD PRESSURE: 80 MMHG | TEMPERATURE: 97 F | OXYGEN SATURATION: 95 % | HEIGHT: 67 IN | WEIGHT: 221.31 LBS | SYSTOLIC BLOOD PRESSURE: 120 MMHG | HEART RATE: 78 BPM | BODY MASS INDEX: 34.73 KG/M2

## 2023-04-14 DIAGNOSIS — E66.9 OBESITY, UNSPECIFIED CLASSIFICATION, UNSPECIFIED OBESITY TYPE, UNSPECIFIED WHETHER SERIOUS COMORBIDITY PRESENT: ICD-10-CM

## 2023-04-14 DIAGNOSIS — Z79.4 TYPE 2 DIABETES MELLITUS WITH HYPERGLYCEMIA, WITH LONG-TERM CURRENT USE OF INSULIN: Primary | ICD-10-CM

## 2023-04-14 DIAGNOSIS — E11.65 TYPE 2 DIABETES MELLITUS WITH HYPERGLYCEMIA, WITH LONG-TERM CURRENT USE OF INSULIN: Primary | ICD-10-CM

## 2023-04-14 DIAGNOSIS — M54.50 CHRONIC LOW BACK PAIN, UNSPECIFIED BACK PAIN LATERALITY, UNSPECIFIED WHETHER SCIATICA PRESENT: ICD-10-CM

## 2023-04-14 DIAGNOSIS — E78.2 MIXED HYPERLIPIDEMIA: ICD-10-CM

## 2023-04-14 DIAGNOSIS — R74.8 ELEVATED LIVER ENZYMES: ICD-10-CM

## 2023-04-14 DIAGNOSIS — G89.29 CHRONIC LOW BACK PAIN, UNSPECIFIED BACK PAIN LATERALITY, UNSPECIFIED WHETHER SCIATICA PRESENT: ICD-10-CM

## 2023-04-14 PROCEDURE — 3074F SYST BP LT 130 MM HG: CPT | Mod: CPTII,,, | Performed by: NURSE PRACTITIONER

## 2023-04-14 PROCEDURE — 1160F PR REVIEW ALL MEDS BY PRESCRIBER/CLIN PHARMACIST DOCUMENTED: ICD-10-PCS | Mod: CPTII,,, | Performed by: NURSE PRACTITIONER

## 2023-04-14 PROCEDURE — 3074F PR MOST RECENT SYSTOLIC BLOOD PRESSURE < 130 MM HG: ICD-10-PCS | Mod: CPTII,,, | Performed by: NURSE PRACTITIONER

## 2023-04-14 PROCEDURE — 1159F PR MEDICATION LIST DOCUMENTED IN MEDICAL RECORD: ICD-10-PCS | Mod: CPTII,,, | Performed by: NURSE PRACTITIONER

## 2023-04-14 PROCEDURE — 4010F PR ACE/ARB THEARPY RXD/TAKEN: ICD-10-PCS | Mod: CPTII,,, | Performed by: NURSE PRACTITIONER

## 2023-04-14 PROCEDURE — 4010F ACE/ARB THERAPY RXD/TAKEN: CPT | Mod: CPTII,,, | Performed by: NURSE PRACTITIONER

## 2023-04-14 PROCEDURE — 99214 OFFICE O/P EST MOD 30 MIN: CPT | Mod: ,,, | Performed by: NURSE PRACTITIONER

## 2023-04-14 PROCEDURE — 3008F BODY MASS INDEX DOCD: CPT | Mod: CPTII,,, | Performed by: NURSE PRACTITIONER

## 2023-04-14 PROCEDURE — 99214 PR OFFICE/OUTPT VISIT, EST, LEVL IV, 30-39 MIN: ICD-10-PCS | Mod: ,,, | Performed by: NURSE PRACTITIONER

## 2023-04-14 PROCEDURE — 1160F RVW MEDS BY RX/DR IN RCRD: CPT | Mod: CPTII,,, | Performed by: NURSE PRACTITIONER

## 2023-04-14 PROCEDURE — 3008F PR BODY MASS INDEX (BMI) DOCUMENTED: ICD-10-PCS | Mod: CPTII,,, | Performed by: NURSE PRACTITIONER

## 2023-04-14 PROCEDURE — 1159F MED LIST DOCD IN RCRD: CPT | Mod: CPTII,,, | Performed by: NURSE PRACTITIONER

## 2023-04-14 PROCEDURE — 3079F DIAST BP 80-89 MM HG: CPT | Mod: CPTII,,, | Performed by: NURSE PRACTITIONER

## 2023-04-14 PROCEDURE — 3079F PR MOST RECENT DIASTOLIC BLOOD PRESSURE 80-89 MM HG: ICD-10-PCS | Mod: CPTII,,, | Performed by: NURSE PRACTITIONER

## 2023-04-14 RX ORDER — DULAGLUTIDE 1.5 MG/.5ML
1.5 INJECTION, SOLUTION SUBCUTANEOUS
Qty: 12 PEN | Refills: 0 | Status: SHIPPED | OUTPATIENT
Start: 2023-04-14 | End: 2023-05-19

## 2023-04-14 NOTE — PROGRESS NOTES
Patient ID: [id number]     Chief Complaint: Diabetes (Follow up)      HPI:     Ysabel Valerio is a 42 y.o. male here today for a follow up.  At his last office visit his hemoglobin A1c was 11.  He was restarted on his medicines which include Trulicity, metformin.  Lantus was added and he was encouraged to titrate up as directed.  He has not received Trulicity or Lantus from the pharmacy.  And he is not sure why.  He is checking his blood sugar with a relatives glucometer because his battery is dead.  His blood sugar fasting ranges from 300 to 180. Jardiance has been on hold (not resumed 2* A1C being 11).    He did establish care with Cardiology and is currently being worked up.  He has an upcoming appointment for a calcium score and results.    He is still having low back pain and feeling tired.  Did not get an appointment for physical therapy.       Past Medical History:  has a past medical history of Chest pain, Diabetes mellitus, type 2, Elevated liver enzymes, GERD (gastroesophageal reflux disease), Hyperlipidemia, Hypertension, Low back pain, Migraine, Obesity, unspecified, and Right leg pain.    Surgical History:  has no past surgical history on file.    Family History: family history includes Hyperlipidemia in his father; Hypertension in his father and mother; Stroke in his sister.    Social History:  reports that he has been smoking cigarettes. He has a 3.75 pack-year smoking history. He has been exposed to tobacco smoke. He has never used smokeless tobacco. He reports current alcohol use. He reports that he does not use drugs.    Current Outpatient Medications   Medication Instructions    aspirin (ECOTRIN) 81 mg, Oral, Daily    atorvastatin (LIPITOR) 40 mg, Oral, Daily    blood sugar diagnostic Strp 1 each, Misc.(Non-Drug; Combo Route), 2 times daily    cyclobenzaprine (FEXMID) 10 mg, Oral, 3 times daily PRN    empagliflozin (JARDIANCE) 25 mg, Oral, Daily    insulin glargine 100 units/mL SubQ pen Inject 15  "units every day at bedtime and increase by 2 units every 3 days the fasting blood sugar is over 150.    irbesartan (AVAPRO) 150 mg, Oral, Nightly    metFORMIN (GLUCOPHAGE-XR) 1,000 mg, Oral, Nightly    metoprolol succinate (TOPROL-XL) 50 mg, Oral, Daily    nabumetone (RELAFEN) 750 mg, Oral, 2 times daily    pen needle, diabetic 32 gauge x 3/16" Ndle 1 each, Misc.(Non-Drug; Combo Route)    topiramate (TOPAMAX) 50 mg, Oral, Daily    TRULICITY 1.5 mg, Subcutaneous, Every 7 days       Patient has No Known Allergies.     Patient Care Team:  SUNDEEP Salazar as PCP - General (Family Medicine)       Subjective:     Review of Systems    See HPI     Objective:     Visit Vitals  /80 (BP Location: Right arm, Patient Position: Sitting)   Pulse 78   Temp 96.8 °F (36 °C) (Temporal)   Ht 5' 7.01" (1.702 m)   Wt 100.4 kg (221 lb 5.5 oz)   SpO2 95%   BMI 34.66 kg/m²       Physical Exam  Constitutional:       Appearance: He is obese.   HENT:      Head: Normocephalic.      Nose: Nose normal.      Mouth/Throat:      Mouth: Mucous membranes are moist.      Pharynx: Oropharynx is clear.   Cardiovascular:      Rate and Rhythm: Normal rate and regular rhythm.      Pulses: Normal pulses.      Heart sounds: Normal heart sounds.   Pulmonary:      Effort: Pulmonary effort is normal.      Breath sounds: Normal breath sounds.   Musculoskeletal:      Right lower leg: No edema.      Left lower leg: No edema.   Skin:     General: Skin is warm and dry.   Neurological:      General: No focal deficit present.      Mental Status: He is alert. Mental status is at baseline.   Psychiatric:         Mood and Affect: Mood normal.         Behavior: Behavior normal.       Labs Reviewed:     Chemistry:  Lab Results   Component Value Date     02/22/2023    K 4.8 02/22/2023    CHLORIDE 100 02/22/2023    BUN 15.0 02/22/2023    CREATININE 0.91 02/22/2023    EGFRNORACEVR >90 02/22/2023    GLUCOSE 371 (H) 02/22/2023    CALCIUM 9.6 02/22/2023    " ALKPHOS 137 02/22/2023    LABPROT 8.1 02/22/2023    ALBUMIN 4.8 02/22/2023    AST 40 02/22/2023    ALT 56 (H) 02/22/2023    TSH 2.180 02/22/2023        Lab Results   Component Value Date    HGBA1C 11.0 (H) 02/22/2023        Hematology:  Lab Results   Component Value Date    WBC 7.3 02/22/2023    RBC 5.76 02/22/2023    HGB 16.6 02/22/2023    HCT 49.0 02/22/2023    MCV 85.1 02/22/2023    MCH 28.8 02/22/2023    MCHC 33.9 02/22/2023    RDW 12.0 02/22/2023     02/22/2023    MPV 11.8 02/22/2023       Lipid Panel:  Lab Results   Component Value Date    CHOL 263 (H) 02/22/2023    HDL 32 (L) 02/22/2023    DLDL 95.6 02/22/2023    TRIG 1,069 (H) 02/22/2023        Assessment:       ICD-10-CM ICD-9-CM   1. Type 2 diabetes mellitus with hyperglycemia, with long-term current use of insulin  E11.65 250.00    Z79.4 790.29     V58.67   2. Obesity, unspecified classification, unspecified obesity type, unspecified whether serious comorbidity present  E66.9 278.00   3. Elevated liver enzymes  R74.8 790.5   4. Mixed hyperlipidemia  E78.2 272.2        Plan:     His pharmacy is not yet open and we will need to check with them as to why he does not have Trulicity or Lantus.  He was encouraged to get a new battery for his glucometer so that he can check his blood sugar every day.  He was encouraged to keep a blood sugar log and bring it to his next appointment in 1 month.  He was encouraged to use the [facility] to communicate with us, especially if he is unable to get his medications after today's appointment.  Was given hand written order to take to the therapy center to schedule physical therapy for his chronic low back pain.    Follow up in about 1 month (around 5/14/2023). In addition to their scheduled follow up, the patient has also been instructed to follow up on as needed basis.     Future Appointments   Date Time Provider Department Center   4/18/2023  9:00 AM OA CT1 610 LB LIMIT [facility]   5/11/2023   8:20 AM APRIL [facility] LABORATORY DRAW STATION [facility] MELANIE Brower   5/18/2023  1:30 PM SUNDEEP Salazar [facility] SUNDEEP Mack

## 2023-04-14 NOTE — TELEPHONE ENCOUNTER
Patient came in for visit today and stated that [facility] was not giving him his Trulicity and Lantus. June asked me to call and see why he was not getting his medication. I called [facility] and spoke with Gerardo and he stated that the Lantus was discontinued and that they did not have Trulicity. I explained to him that on our side the Lantus was not discontinued and that the patient needed it. He stated that he would fill it but the Trulicity he would have to get at a different pharmacy.     I called the patient and explained that to him and he stated that he wanted it sent to [facility]. I explained to him that [facility] would have the Lantus and that the Trulicity would be sent to [facility] and that he needed to get them today and start taking them today. Patient verbalized understanding.

## 2023-04-17 ENCOUNTER — TELEPHONE (OUTPATIENT)
Dept: FAMILY MEDICINE | Facility: CLINIC | Age: 43
End: 2023-04-17
Payer: MEDICAID

## 2023-04-17 DIAGNOSIS — E11.65 TYPE 2 DIABETES MELLITUS WITH HYPERGLYCEMIA, WITH LONG-TERM CURRENT USE OF INSULIN: Primary | ICD-10-CM

## 2023-04-17 DIAGNOSIS — Z79.4 TYPE 2 DIABETES MELLITUS WITH HYPERGLYCEMIA, WITH LONG-TERM CURRENT USE OF INSULIN: Primary | ICD-10-CM

## 2023-04-17 RX ORDER — SEMAGLUTIDE 0.68 MG/ML
0.5 INJECTION, SOLUTION SUBCUTANEOUS
Qty: 3 ML | Refills: 1 | Status: SHIPPED | OUTPATIENT
Start: 2023-04-17 | End: 2023-05-19 | Stop reason: DRUGHIGH

## 2023-04-17 RX ORDER — SEMAGLUTIDE 0.68 MG/ML
0.5 INJECTION, SOLUTION SUBCUTANEOUS
COMMUNITY
End: 2023-04-17 | Stop reason: SDUPTHER

## 2023-04-18 ENCOUNTER — HOSPITAL ENCOUNTER (OUTPATIENT)
Dept: RADIOLOGY | Facility: HOSPITAL | Age: 43
Discharge: HOME | End: 2023-04-18
Attending: NURSE PRACTITIONER
Payer: MEDICAID

## 2023-04-18 DIAGNOSIS — R07.9 CHEST PAIN, UNSPECIFIED: ICD-10-CM

## 2023-04-18 PROCEDURE — 75571 CT HRT W/O DYE W/CA TEST: CPT | Mod: TC

## 2023-04-24 ENCOUNTER — TELEPHONE (OUTPATIENT)
Dept: FAMILY MEDICINE | Facility: CLINIC | Age: 43
End: 2023-04-24
Payer: MEDICAID

## 2023-04-24 DIAGNOSIS — E11.65 TYPE 2 DIABETES MELLITUS WITH HYPERGLYCEMIA, WITH LONG-TERM CURRENT USE OF INSULIN: Primary | ICD-10-CM

## 2023-04-24 DIAGNOSIS — Z79.4 TYPE 2 DIABETES MELLITUS WITH HYPERGLYCEMIA, WITH LONG-TERM CURRENT USE OF INSULIN: Primary | ICD-10-CM

## 2023-04-24 RX ORDER — BLOOD SUGAR DIAGNOSTIC
1 STRIP MISCELLANEOUS 3 TIMES DAILY
Qty: 100 EACH | Refills: 3 | Status: SHIPPED | OUTPATIENT
Start: 2023-04-24 | End: 2024-01-15 | Stop reason: SDUPTHER

## 2023-04-24 NOTE — TELEPHONE ENCOUNTER
----- Message from Angeline Mariscal sent at 4/24/2023  1:31 PM CDT -----  Regarding: refill  Needles for her diana Vazquez's        [phone]

## 2023-05-11 PROCEDURE — 80053 COMPREHEN METABOLIC PANEL: CPT | Performed by: NURSE PRACTITIONER

## 2023-05-11 PROCEDURE — 85025 COMPLETE CBC W/AUTO DIFF WBC: CPT | Performed by: NURSE PRACTITIONER

## 2023-05-11 PROCEDURE — 83036 HEMOGLOBIN GLYCOSYLATED A1C: CPT | Performed by: NURSE PRACTITIONER

## 2023-05-12 ENCOUNTER — DOCUMENTATION ONLY (OUTPATIENT)
Dept: ADMINISTRATIVE | Facility: HOSPITAL | Age: 43
End: 2023-05-12
Payer: MEDICAID

## 2023-05-19 ENCOUNTER — OFFICE VISIT (OUTPATIENT)
Dept: FAMILY MEDICINE | Facility: CLINIC | Age: 43
End: 2023-05-19
Payer: MEDICAID

## 2023-05-19 VITALS
SYSTOLIC BLOOD PRESSURE: 118 MMHG | HEART RATE: 80 BPM | WEIGHT: 226 LBS | DIASTOLIC BLOOD PRESSURE: 84 MMHG | TEMPERATURE: 98 F | OXYGEN SATURATION: 99 % | BODY MASS INDEX: 35.39 KG/M2

## 2023-05-19 DIAGNOSIS — E11.65 TYPE 2 DIABETES MELLITUS WITH HYPERGLYCEMIA, WITH LONG-TERM CURRENT USE OF INSULIN: Primary | ICD-10-CM

## 2023-05-19 DIAGNOSIS — E78.2 MIXED HYPERLIPIDEMIA: ICD-10-CM

## 2023-05-19 DIAGNOSIS — Z79.4 TYPE 2 DIABETES MELLITUS WITH HYPERGLYCEMIA, WITH LONG-TERM CURRENT USE OF INSULIN: Primary | ICD-10-CM

## 2023-05-19 DIAGNOSIS — E66.9 OBESITY, UNSPECIFIED CLASSIFICATION, UNSPECIFIED OBESITY TYPE, UNSPECIFIED WHETHER SERIOUS COMORBIDITY PRESENT: ICD-10-CM

## 2023-05-19 DIAGNOSIS — K76.0 FATTY LIVER DISEASE, NONALCOHOLIC: ICD-10-CM

## 2023-05-19 DIAGNOSIS — R74.8 ELEVATED LIVER ENZYMES: ICD-10-CM

## 2023-05-19 PROCEDURE — 1160F PR REVIEW ALL MEDS BY PRESCRIBER/CLIN PHARMACIST DOCUMENTED: ICD-10-PCS | Mod: CPTII,,, | Performed by: NURSE PRACTITIONER

## 2023-05-19 PROCEDURE — 4010F ACE/ARB THERAPY RXD/TAKEN: CPT | Mod: CPTII,,, | Performed by: NURSE PRACTITIONER

## 2023-05-19 PROCEDURE — 1160F RVW MEDS BY RX/DR IN RCRD: CPT | Mod: CPTII,,, | Performed by: NURSE PRACTITIONER

## 2023-05-19 PROCEDURE — 3008F PR BODY MASS INDEX (BMI) DOCUMENTED: ICD-10-PCS | Mod: CPTII,,, | Performed by: NURSE PRACTITIONER

## 2023-05-19 PROCEDURE — 99214 PR OFFICE/OUTPT VISIT, EST, LEVL IV, 30-39 MIN: ICD-10-PCS | Mod: ,,, | Performed by: NURSE PRACTITIONER

## 2023-05-19 PROCEDURE — 99214 OFFICE O/P EST MOD 30 MIN: CPT | Mod: ,,, | Performed by: NURSE PRACTITIONER

## 2023-05-19 PROCEDURE — 3074F SYST BP LT 130 MM HG: CPT | Mod: CPTII,,, | Performed by: NURSE PRACTITIONER

## 2023-05-19 PROCEDURE — 3074F PR MOST RECENT SYSTOLIC BLOOD PRESSURE < 130 MM HG: ICD-10-PCS | Mod: CPTII,,, | Performed by: NURSE PRACTITIONER

## 2023-05-19 PROCEDURE — 3079F DIAST BP 80-89 MM HG: CPT | Mod: CPTII,,, | Performed by: NURSE PRACTITIONER

## 2023-05-19 PROCEDURE — 3008F BODY MASS INDEX DOCD: CPT | Mod: CPTII,,, | Performed by: NURSE PRACTITIONER

## 2023-05-19 PROCEDURE — 1159F MED LIST DOCD IN RCRD: CPT | Mod: CPTII,,, | Performed by: NURSE PRACTITIONER

## 2023-05-19 PROCEDURE — 1159F PR MEDICATION LIST DOCUMENTED IN MEDICAL RECORD: ICD-10-PCS | Mod: CPTII,,, | Performed by: NURSE PRACTITIONER

## 2023-05-19 PROCEDURE — 3079F PR MOST RECENT DIASTOLIC BLOOD PRESSURE 80-89 MM HG: ICD-10-PCS | Mod: CPTII,,, | Performed by: NURSE PRACTITIONER

## 2023-05-19 PROCEDURE — 4010F PR ACE/ARB THEARPY RXD/TAKEN: ICD-10-PCS | Mod: CPTII,,, | Performed by: NURSE PRACTITIONER

## 2023-05-19 RX ORDER — NABUMETONE 750 MG/1
750 TABLET, FILM COATED ORAL 2 TIMES DAILY PRN
Qty: 60 TABLET | Refills: 0 | Status: SHIPPED | OUTPATIENT
Start: 2023-05-19 | End: 2023-05-30 | Stop reason: SDUPTHER

## 2023-05-19 RX ORDER — SEMAGLUTIDE 1.34 MG/ML
1 INJECTION, SOLUTION SUBCUTANEOUS
Qty: 9 ML | Refills: 0 | Status: SHIPPED | OUTPATIENT
Start: 2023-05-19 | End: 2023-06-21 | Stop reason: SDUPTHER

## 2023-05-19 NOTE — PROGRESS NOTES
"   Patient ID: [id number]     Chief Complaint: Diabetes      HPI:     Ysabel Valerio is a 42 y.o. male here today for follow up on his uncontrolled diabetes.  Recent labs reviewed and discussed with him today. Highest FBG was 320.  Lowest has been 155. He is up to Lantus 30 units at night.  Denies abdominal pain, nausea, vomiting, diarrhea.    Low back pain: improved after 4 weeks of PT.  Has another month of visits.     Past Medical History:  has a past medical history of Chest pain, Diabetes mellitus, type 2, Elevated liver enzymes, GERD (gastroesophageal reflux disease), Hyperlipidemia, Hypertension, Low back pain, Migraine, Obesity, unspecified, Right leg pain, and Seizures.    Surgical History:  has no past surgical history on file.    Family History: family history includes Hyperlipidemia in his father; Hypertension in his father and mother; Stroke in his sister.    Social History:  reports that he has been smoking cigarettes. He has a 3.75 pack-year smoking history. He has been exposed to tobacco smoke. He has never used smokeless tobacco. He reports current alcohol use. He reports that he does not use drugs.    Current Outpatient Medications   Medication Instructions    atorvastatin (LIPITOR) 40 mg, Oral, Daily    blood sugar diagnostic Strp 1 each, Misc.(Non-Drug; Combo Route), 2 times daily    cyclobenzaprine (FEXMID) 10 mg, Oral, 3 times daily PRN    empagliflozin (JARDIANCE) 25 mg, Oral, Daily    insulin glargine 100 units/mL SubQ pen Inject 15 units every day at bedtime and increase by 2 units every 3 days the fasting blood sugar is over 150.    irbesartan (AVAPRO) 150 mg, Oral, Nightly    metFORMIN (GLUCOPHAGE-XR) 1,000 mg, Oral, Nightly    metoprolol succinate (TOPROL-XL) 50 mg, Oral, Daily    nabumetone (RELAFEN) 750 mg, Oral, 2 times daily PRN    OZEMPIC 1 mg, Subcutaneous, Every 7 days    pen needle, diabetic 32 gauge x 3/16" Ndle 1 each, Misc.(Non-Drug; Combo Route), 3 times daily    topiramate " (TOPAMAX) 50 mg, Oral, Daily       Patient has No Known Allergies.     Patient Care Team:  SUNDEEP Salazar as PCP - General (Family Medicine)  Nate Perez OD as Consulting Physician (Optometry)  [facility] Ortega MD as Consulting Physician (Cardiology)       Subjective:     Review of Systems    See HPI     Objective:     Visit Vitals  /84   Pulse 80   Temp 98.1 °F (36.7 °C) (Temporal)   Wt 102.5 kg (226 lb)   SpO2 99%   BMI 35.39 kg/m²       Physical Exam  Vitals reviewed.   Constitutional:       Appearance: Normal appearance. He is obese.   Cardiovascular:      Rate and Rhythm: Normal rate and regular rhythm.      Heart sounds: Normal heart sounds.   Pulmonary:      Effort: Pulmonary effort is normal.      Breath sounds: Normal breath sounds.   Skin:     General: Skin is warm and dry.   Neurological:      Mental Status: He is alert and oriented to person, place, and time.   Psychiatric:         Mood and Affect: Mood normal.     Labs Reviewed:     Chemistry:  Lab Results   Component Value Date     05/11/2023    K 4.2 05/11/2023    CHLORIDE 106 05/11/2023    BUN 16.0 05/11/2023    CREATININE 0.93 05/11/2023    EGFRNORACEVR >90 05/11/2023    GLUCOSE 139 (H) 05/11/2023    CALCIUM 9.2 05/11/2023    ALKPHOS 128 05/11/2023    LABPROT 7.3 05/11/2023    ALBUMIN 4.6 05/11/2023    AST 51 05/11/2023    ALT 90 (H) 05/11/2023    TSH 2.180 02/22/2023        Lab Results   Component Value Date    HGBA1C 9.4 (H) 05/11/2023        Hematology:  Lab Results   Component Value Date    WBC 10.22 05/11/2023    RBC 5.01 05/11/2023    HGB 14.6 05/11/2023    HCT 42.9 05/11/2023    MCV 85.6 05/11/2023    MCH 29.1 05/11/2023    MCHC 34.0 05/11/2023    RDW 12.4 05/11/2023     05/11/2023    MPV 11.2 05/11/2023       Lipid Panel:  Lab Results   Component Value Date    CHOL 263 (H) 02/22/2023    HDL 32 (L) 02/22/2023    DLDL 95.6 02/22/2023    TRIG 1,069 (H) 02/22/2023         Assessment:       ICD-10-CM ICD-9-CM   1. Type 2 diabetes mellitus with hyperglycemia, with long-term current use of insulin  E11.65 250.00    Z79.4 790.29     V58.67   2. Mixed hyperlipidemia  E78.2 272.2   3. Obesity, unspecified classification, unspecified obesity type, unspecified whether serious comorbidity present  E66.9 278.00   4. Fatty liver disease, nonalcoholic  K76.0 571.8   5. Elevated liver enzymes  R74.8 790.5        Plan:       Increase Ozempic to 1 mg weekly  Restart Jardiance 25 mg  Decrease Lantus back to 15 units nightly.  Stop for FBG less than 150.  Repeat labs in 3 months and RTC for results.  Continue PT for LBP and f/u PRN     Follow up in about 3 months (around 8/19/2023) for Follow Up, Labs Prior. In addition to their scheduled follow up, the patient has also been instructed to follow up on as needed basis.     Future Appointments   Date Time Provider Department Center   8/18/2023  9:00 AM LAB, [facility] LABORATORY DRAW STATION [facility]   8/21/2023  [facility]

## 2023-05-30 DIAGNOSIS — E11.9 TYPE 2 DIABETES MELLITUS WITHOUT COMPLICATION, UNSPECIFIED WHETHER LONG TERM INSULIN USE: ICD-10-CM

## 2023-05-30 DIAGNOSIS — G43.901 MIGRAINE WITH STATUS MIGRAINOSUS, NOT INTRACTABLE, UNSPECIFIED MIGRAINE TYPE: ICD-10-CM

## 2023-05-30 DIAGNOSIS — M54.50 LOW BACK PAIN WITHOUT SCIATICA, UNSPECIFIED BACK PAIN LATERALITY, UNSPECIFIED CHRONICITY: ICD-10-CM

## 2023-05-30 DIAGNOSIS — E78.5 HYPERLIPIDEMIA, UNSPECIFIED HYPERLIPIDEMIA TYPE: Primary | ICD-10-CM

## 2023-05-30 DIAGNOSIS — I10 HYPERTENSION, UNSPECIFIED TYPE: ICD-10-CM

## 2023-05-31 RX ORDER — IRBESARTAN 150 MG/1
150 TABLET ORAL NIGHTLY
Qty: 90 TABLET | Refills: 0 | Status: SHIPPED | OUTPATIENT
Start: 2023-05-31 | End: 2023-07-25 | Stop reason: SDUPTHER

## 2023-05-31 RX ORDER — ATORVASTATIN CALCIUM 40 MG/1
40 TABLET, FILM COATED ORAL DAILY
Qty: 90 TABLET | Refills: 0 | Status: SHIPPED | OUTPATIENT
Start: 2023-05-31 | End: 2023-07-25 | Stop reason: SDUPTHER

## 2023-05-31 RX ORDER — TOPIRAMATE 50 MG/1
50 TABLET, FILM COATED ORAL DAILY
Qty: 90 TABLET | Refills: 0 | Status: SHIPPED | OUTPATIENT
Start: 2023-05-31 | End: 2023-07-25 | Stop reason: SDUPTHER

## 2023-05-31 RX ORDER — METOPROLOL SUCCINATE 50 MG/1
50 TABLET, EXTENDED RELEASE ORAL DAILY
Qty: 90 TABLET | Refills: 0 | Status: SHIPPED | OUTPATIENT
Start: 2023-05-31 | End: 2023-07-25 | Stop reason: SDUPTHER

## 2023-05-31 RX ORDER — METFORMIN HYDROCHLORIDE 500 MG/1
1000 TABLET, EXTENDED RELEASE ORAL NIGHTLY
Qty: 180 TABLET | Refills: 0 | Status: SHIPPED | OUTPATIENT
Start: 2023-05-31 | End: 2023-07-25 | Stop reason: SDUPTHER

## 2023-05-31 RX ORDER — NABUMETONE 750 MG/1
750 TABLET, FILM COATED ORAL 2 TIMES DAILY PRN
Qty: 60 TABLET | Refills: 0 | Status: SHIPPED | OUTPATIENT
Start: 2023-05-31 | End: 2023-07-25 | Stop reason: SDUPTHER

## 2023-06-21 DIAGNOSIS — Z79.4 TYPE 2 DIABETES MELLITUS WITHOUT COMPLICATION, WITH LONG-TERM CURRENT USE OF INSULIN: ICD-10-CM

## 2023-06-21 DIAGNOSIS — E11.9 TYPE 2 DIABETES MELLITUS WITHOUT COMPLICATION, WITH LONG-TERM CURRENT USE OF INSULIN: ICD-10-CM

## 2023-06-22 RX ORDER — SEMAGLUTIDE 1.34 MG/ML
1 INJECTION, SOLUTION SUBCUTANEOUS
Qty: 9 ML | Refills: 0 | Status: SHIPPED | OUTPATIENT
Start: 2023-06-22 | End: 2023-07-25 | Stop reason: SDUPTHER

## 2023-06-22 RX ORDER — INSULIN GLARGINE 100 [IU]/ML
INJECTION, SOLUTION SUBCUTANEOUS
Qty: 3 ML | Refills: 2 | Status: SHIPPED | OUTPATIENT
Start: 2023-06-22 | End: 2024-01-15 | Stop reason: SDUPTHER

## 2023-07-25 DIAGNOSIS — E11.9 TYPE 2 DIABETES MELLITUS WITHOUT COMPLICATION, WITH LONG-TERM CURRENT USE OF INSULIN: ICD-10-CM

## 2023-07-25 DIAGNOSIS — E11.9 TYPE 2 DIABETES MELLITUS WITHOUT COMPLICATION, UNSPECIFIED WHETHER LONG TERM INSULIN USE: ICD-10-CM

## 2023-07-25 DIAGNOSIS — G43.901 MIGRAINE WITH STATUS MIGRAINOSUS, NOT INTRACTABLE, UNSPECIFIED MIGRAINE TYPE: ICD-10-CM

## 2023-07-25 DIAGNOSIS — Z79.4 TYPE 2 DIABETES MELLITUS WITHOUT COMPLICATION, WITH LONG-TERM CURRENT USE OF INSULIN: ICD-10-CM

## 2023-07-25 DIAGNOSIS — E78.5 HYPERLIPIDEMIA, UNSPECIFIED HYPERLIPIDEMIA TYPE: ICD-10-CM

## 2023-07-25 DIAGNOSIS — I10 HYPERTENSION, UNSPECIFIED TYPE: ICD-10-CM

## 2023-07-25 DIAGNOSIS — M54.50 LOW BACK PAIN WITHOUT SCIATICA, UNSPECIFIED BACK PAIN LATERALITY, UNSPECIFIED CHRONICITY: ICD-10-CM

## 2023-07-26 ENCOUNTER — PATIENT MESSAGE (OUTPATIENT)
Dept: FAMILY MEDICINE | Facility: CLINIC | Age: 43
End: 2023-07-26
Payer: MEDICAID

## 2023-07-26 RX ORDER — METFORMIN HYDROCHLORIDE 500 MG/1
1000 TABLET, EXTENDED RELEASE ORAL NIGHTLY
Qty: 180 TABLET | Refills: 0 | Status: SHIPPED | OUTPATIENT
Start: 2023-07-26 | End: 2024-01-15 | Stop reason: SDUPTHER

## 2023-07-26 RX ORDER — NABUMETONE 750 MG/1
750 TABLET, FILM COATED ORAL 2 TIMES DAILY PRN
Qty: 60 TABLET | Refills: 0 | Status: SHIPPED | OUTPATIENT
Start: 2023-07-26 | End: 2024-01-15 | Stop reason: SDUPTHER

## 2023-07-26 RX ORDER — IRBESARTAN 150 MG/1
150 TABLET ORAL NIGHTLY
Qty: 90 TABLET | Refills: 0 | Status: SHIPPED | OUTPATIENT
Start: 2023-07-26 | End: 2024-01-15 | Stop reason: SDUPTHER

## 2023-07-26 RX ORDER — TOPIRAMATE 50 MG/1
50 TABLET, FILM COATED ORAL DAILY
Qty: 90 TABLET | Refills: 0 | Status: SHIPPED | OUTPATIENT
Start: 2023-07-26 | End: 2024-01-15 | Stop reason: SDUPTHER

## 2023-07-26 RX ORDER — SEMAGLUTIDE 1.34 MG/ML
1 INJECTION, SOLUTION SUBCUTANEOUS
Qty: 9 ML | Refills: 0 | Status: SHIPPED | OUTPATIENT
Start: 2023-07-26 | End: 2024-01-15 | Stop reason: SDUPTHER

## 2023-07-26 RX ORDER — METOPROLOL SUCCINATE 50 MG/1
50 TABLET, EXTENDED RELEASE ORAL DAILY
Qty: 90 TABLET | Refills: 0 | Status: SHIPPED | OUTPATIENT
Start: 2023-07-26 | End: 2024-01-15 | Stop reason: SDUPTHER

## 2023-07-26 RX ORDER — ATORVASTATIN CALCIUM 40 MG/1
40 TABLET, FILM COATED ORAL DAILY
Qty: 90 TABLET | Refills: 0 | Status: SHIPPED | OUTPATIENT
Start: 2023-07-26 | End: 2024-01-15 | Stop reason: SDUPTHER

## 2023-08-03 ENCOUNTER — TELEPHONE (OUTPATIENT)
Dept: FAMILY MEDICINE | Facility: CLINIC | Age: 43
End: 2023-08-03
Payer: MEDICAID

## 2023-08-04 ENCOUNTER — TELEPHONE (OUTPATIENT)
Dept: FAMILY MEDICINE | Facility: CLINIC | Age: 43
End: 2023-08-04
Payer: MEDICAID

## 2023-08-04 DIAGNOSIS — Z12.11 SCREENING FOR COLON CANCER: Primary | ICD-10-CM

## 2023-08-04 NOTE — TELEPHONE ENCOUNTER
Patient agreed to cologuard so order was placed and explained to patient what to do when he got test.

## 2024-01-15 DIAGNOSIS — E78.5 HYPERLIPIDEMIA, UNSPECIFIED HYPERLIPIDEMIA TYPE: ICD-10-CM

## 2024-01-15 DIAGNOSIS — I10 HYPERTENSION, UNSPECIFIED TYPE: ICD-10-CM

## 2024-01-15 DIAGNOSIS — E11.65 TYPE 2 DIABETES MELLITUS WITH HYPERGLYCEMIA, WITH LONG-TERM CURRENT USE OF INSULIN: ICD-10-CM

## 2024-01-15 DIAGNOSIS — E11.9 TYPE 2 DIABETES MELLITUS WITHOUT COMPLICATION, UNSPECIFIED WHETHER LONG TERM INSULIN USE: ICD-10-CM

## 2024-01-15 DIAGNOSIS — G43.901 MIGRAINE WITH STATUS MIGRAINOSUS, NOT INTRACTABLE, UNSPECIFIED MIGRAINE TYPE: ICD-10-CM

## 2024-01-15 DIAGNOSIS — M54.50 LOW BACK PAIN WITHOUT SCIATICA, UNSPECIFIED BACK PAIN LATERALITY, UNSPECIFIED CHRONICITY: ICD-10-CM

## 2024-01-15 DIAGNOSIS — Z79.4 TYPE 2 DIABETES MELLITUS WITH HYPERGLYCEMIA, WITH LONG-TERM CURRENT USE OF INSULIN: ICD-10-CM

## 2024-01-15 DIAGNOSIS — E11.9 TYPE 2 DIABETES MELLITUS WITHOUT COMPLICATION, WITH LONG-TERM CURRENT USE OF INSULIN: ICD-10-CM

## 2024-01-15 DIAGNOSIS — Z79.4 TYPE 2 DIABETES MELLITUS WITHOUT COMPLICATION, WITH LONG-TERM CURRENT USE OF INSULIN: ICD-10-CM

## 2024-01-16 RX ORDER — ATORVASTATIN CALCIUM 40 MG/1
40 TABLET, FILM COATED ORAL DAILY
Qty: 90 TABLET | Refills: 0 | Status: SHIPPED | OUTPATIENT
Start: 2024-01-16 | End: 2024-01-17 | Stop reason: SDUPTHER

## 2024-01-16 RX ORDER — SEMAGLUTIDE 1.34 MG/ML
1 INJECTION, SOLUTION SUBCUTANEOUS
Qty: 9 ML | Refills: 0 | Status: SHIPPED | OUTPATIENT
Start: 2024-01-16 | End: 2024-01-17 | Stop reason: SDUPTHER

## 2024-01-16 RX ORDER — INSULIN GLARGINE 100 [IU]/ML
INJECTION, SOLUTION SUBCUTANEOUS
Qty: 3 ML | Refills: 0 | Status: SHIPPED | OUTPATIENT
Start: 2024-01-16 | End: 2024-01-17 | Stop reason: SDUPTHER

## 2024-01-16 RX ORDER — BLOOD SUGAR DIAGNOSTIC
1 STRIP MISCELLANEOUS 3 TIMES DAILY
Qty: 100 EACH | Refills: 0 | Status: SHIPPED | OUTPATIENT
Start: 2024-01-16 | End: 2024-01-17 | Stop reason: SDUPTHER

## 2024-01-16 RX ORDER — NABUMETONE 750 MG/1
750 TABLET, FILM COATED ORAL 2 TIMES DAILY PRN
Qty: 60 TABLET | Refills: 0 | Status: SHIPPED | OUTPATIENT
Start: 2024-01-16 | End: 2024-01-17 | Stop reason: SDUPTHER

## 2024-01-16 RX ORDER — METFORMIN HYDROCHLORIDE 500 MG/1
1000 TABLET, EXTENDED RELEASE ORAL NIGHTLY
Qty: 180 TABLET | Refills: 0 | Status: SHIPPED | OUTPATIENT
Start: 2024-01-16 | End: 2024-01-17 | Stop reason: SDUPTHER

## 2024-01-16 RX ORDER — TOPIRAMATE 50 MG/1
50 TABLET, FILM COATED ORAL DAILY
Qty: 90 TABLET | Refills: 0 | Status: SHIPPED | OUTPATIENT
Start: 2024-01-16 | End: 2024-01-17 | Stop reason: SDUPTHER

## 2024-01-16 RX ORDER — METOPROLOL SUCCINATE 50 MG/1
50 TABLET, EXTENDED RELEASE ORAL DAILY
Qty: 90 TABLET | Refills: 0 | Status: SHIPPED | OUTPATIENT
Start: 2024-01-16 | End: 2024-01-17 | Stop reason: SDUPTHER

## 2024-01-16 RX ORDER — IRBESARTAN 150 MG/1
150 TABLET ORAL NIGHTLY
Qty: 90 TABLET | Refills: 0 | Status: SHIPPED | OUTPATIENT
Start: 2024-01-16 | End: 2024-01-17 | Stop reason: SDUPTHER

## 2024-01-16 NOTE — TELEPHONE ENCOUNTER
Refilled all with no refills per ethan, Patient needs an appointment. Tried to call paitent with no answer.

## 2024-01-17 ENCOUNTER — TELEPHONE (OUTPATIENT)
Dept: FAMILY MEDICINE | Facility: CLINIC | Age: 44
End: 2024-01-17
Payer: MEDICAID

## 2024-01-17 DIAGNOSIS — E11.65 TYPE 2 DIABETES MELLITUS WITH HYPERGLYCEMIA, WITH LONG-TERM CURRENT USE OF INSULIN: ICD-10-CM

## 2024-01-17 DIAGNOSIS — G43.901 MIGRAINE WITH STATUS MIGRAINOSUS, NOT INTRACTABLE, UNSPECIFIED MIGRAINE TYPE: ICD-10-CM

## 2024-01-17 DIAGNOSIS — M54.50 LOW BACK PAIN WITHOUT SCIATICA, UNSPECIFIED BACK PAIN LATERALITY, UNSPECIFIED CHRONICITY: ICD-10-CM

## 2024-01-17 DIAGNOSIS — Z79.4 TYPE 2 DIABETES MELLITUS WITHOUT COMPLICATION, WITH LONG-TERM CURRENT USE OF INSULIN: ICD-10-CM

## 2024-01-17 DIAGNOSIS — I10 HYPERTENSION, UNSPECIFIED TYPE: ICD-10-CM

## 2024-01-17 DIAGNOSIS — E78.5 HYPERLIPIDEMIA, UNSPECIFIED HYPERLIPIDEMIA TYPE: ICD-10-CM

## 2024-01-17 DIAGNOSIS — E11.9 TYPE 2 DIABETES MELLITUS WITHOUT COMPLICATION, WITH LONG-TERM CURRENT USE OF INSULIN: ICD-10-CM

## 2024-01-17 DIAGNOSIS — Z79.4 TYPE 2 DIABETES MELLITUS WITH HYPERGLYCEMIA, WITH LONG-TERM CURRENT USE OF INSULIN: ICD-10-CM

## 2024-01-17 DIAGNOSIS — E11.9 TYPE 2 DIABETES MELLITUS WITHOUT COMPLICATION, UNSPECIFIED WHETHER LONG TERM INSULIN USE: ICD-10-CM

## 2024-01-17 RX ORDER — METOPROLOL SUCCINATE 50 MG/1
50 TABLET, EXTENDED RELEASE ORAL DAILY
Qty: 90 TABLET | Refills: 0 | Status: SHIPPED | OUTPATIENT
Start: 2024-01-17

## 2024-01-17 RX ORDER — TOPIRAMATE 50 MG/1
50 TABLET, FILM COATED ORAL DAILY
Qty: 90 TABLET | Refills: 0 | Status: SHIPPED | OUTPATIENT
Start: 2024-01-17 | End: 2024-02-14

## 2024-01-17 RX ORDER — NABUMETONE 750 MG/1
750 TABLET, FILM COATED ORAL 2 TIMES DAILY PRN
Qty: 60 TABLET | Refills: 0 | Status: SHIPPED | OUTPATIENT
Start: 2024-01-17 | End: 2024-02-20 | Stop reason: SDUPTHER

## 2024-01-17 RX ORDER — INSULIN GLARGINE 100 [IU]/ML
INJECTION, SOLUTION SUBCUTANEOUS
Qty: 3 ML | Refills: 0 | Status: SHIPPED | OUTPATIENT
Start: 2024-01-17 | End: 2024-01-22 | Stop reason: SDUPTHER

## 2024-01-17 RX ORDER — SEMAGLUTIDE 1.34 MG/ML
1 INJECTION, SOLUTION SUBCUTANEOUS
Qty: 9 ML | Refills: 0 | Status: SHIPPED | OUTPATIENT
Start: 2024-01-17 | End: 2024-02-14 | Stop reason: ALTCHOICE

## 2024-01-17 RX ORDER — METFORMIN HYDROCHLORIDE 500 MG/1
1000 TABLET, EXTENDED RELEASE ORAL NIGHTLY
Qty: 180 TABLET | Refills: 0 | Status: SHIPPED | OUTPATIENT
Start: 2024-01-17 | End: 2025-01-16

## 2024-01-17 RX ORDER — BLOOD SUGAR DIAGNOSTIC
1 STRIP MISCELLANEOUS 3 TIMES DAILY
Qty: 100 EACH | Refills: 0 | Status: SHIPPED | OUTPATIENT
Start: 2024-01-17 | End: 2024-01-22 | Stop reason: SDUPTHER

## 2024-01-17 RX ORDER — ATORVASTATIN CALCIUM 40 MG/1
40 TABLET, FILM COATED ORAL DAILY
Qty: 90 TABLET | Refills: 0 | Status: SHIPPED | OUTPATIENT
Start: 2024-01-17 | End: 2024-02-14 | Stop reason: SDUPTHER

## 2024-01-17 RX ORDER — IRBESARTAN 150 MG/1
150 TABLET ORAL NIGHTLY
Qty: 90 TABLET | Refills: 0 | Status: SHIPPED | OUTPATIENT
Start: 2024-01-17

## 2024-01-22 ENCOUNTER — TELEPHONE (OUTPATIENT)
Dept: FAMILY MEDICINE | Facility: CLINIC | Age: 44
End: 2024-01-22
Payer: MEDICAID

## 2024-01-22 DIAGNOSIS — E11.9 TYPE 2 DIABETES MELLITUS WITHOUT COMPLICATION, WITH LONG-TERM CURRENT USE OF INSULIN: ICD-10-CM

## 2024-01-22 DIAGNOSIS — Z79.4 TYPE 2 DIABETES MELLITUS WITHOUT COMPLICATION, WITH LONG-TERM CURRENT USE OF INSULIN: ICD-10-CM

## 2024-01-22 DIAGNOSIS — E11.65 TYPE 2 DIABETES MELLITUS WITH HYPERGLYCEMIA, WITH LONG-TERM CURRENT USE OF INSULIN: ICD-10-CM

## 2024-01-22 DIAGNOSIS — Z79.4 TYPE 2 DIABETES MELLITUS WITH HYPERGLYCEMIA, WITH LONG-TERM CURRENT USE OF INSULIN: ICD-10-CM

## 2024-01-22 RX ORDER — BLOOD SUGAR DIAGNOSTIC
1 STRIP MISCELLANEOUS 3 TIMES DAILY
Qty: 100 EACH | Refills: 0 | Status: SHIPPED | OUTPATIENT
Start: 2024-01-22

## 2024-01-22 RX ORDER — INSULIN GLARGINE 100 [IU]/ML
INJECTION, SOLUTION SUBCUTANEOUS
Qty: 3 ML | Refills: 0 | Status: SHIPPED | OUTPATIENT
Start: 2024-01-22

## 2024-01-29 ENCOUNTER — TELEPHONE (OUTPATIENT)
Dept: FAMILY MEDICINE | Facility: CLINIC | Age: 44
End: 2024-01-29
Payer: MEDICAID

## 2024-01-29 DIAGNOSIS — E11.9 DIABETES MELLITUS WITHOUT COMPLICATION: Primary | ICD-10-CM

## 2024-01-30 ENCOUNTER — TELEPHONE (OUTPATIENT)
Dept: FAMILY MEDICINE | Facility: CLINIC | Age: 44
End: 2024-01-30
Payer: MEDICAID

## 2024-01-30 RX ORDER — INSULIN GLARGINE 100 [IU]/ML
INJECTION, SOLUTION SUBCUTANEOUS
Qty: 1 EACH | Refills: 0 | Status: SHIPPED | OUTPATIENT
Start: 2024-01-30 | End: 2024-02-14 | Stop reason: SDUPTHER

## 2024-01-30 NOTE — TELEPHONE ENCOUNTER
insulin glargine 100 units/mL SubQ pen   01/22/24  --  June Leone FNP-JOHANAN    Inject 15 units every day at bedtime and increase by 2 units every 3 days the fasting blood sugar is over 150.

## 2024-02-02 PROCEDURE — 83036 HEMOGLOBIN GLYCOSYLATED A1C: CPT | Performed by: NURSE PRACTITIONER

## 2024-02-02 PROCEDURE — 80053 COMPREHEN METABOLIC PANEL: CPT | Performed by: NURSE PRACTITIONER

## 2024-02-02 PROCEDURE — 82043 UR ALBUMIN QUANTITATIVE: CPT | Performed by: NURSE PRACTITIONER

## 2024-02-02 PROCEDURE — 85025 COMPLETE CBC W/AUTO DIFF WBC: CPT | Performed by: NURSE PRACTITIONER

## 2024-02-02 PROCEDURE — 80061 LIPID PANEL: CPT | Performed by: NURSE PRACTITIONER

## 2024-02-14 ENCOUNTER — OFFICE VISIT (OUTPATIENT)
Dept: FAMILY MEDICINE | Facility: CLINIC | Age: 44
End: 2024-02-14
Payer: MEDICAID

## 2024-02-14 VITALS
HEART RATE: 100 BPM | WEIGHT: 223.63 LBS | SYSTOLIC BLOOD PRESSURE: 116 MMHG | HEIGHT: 67 IN | OXYGEN SATURATION: 97 % | TEMPERATURE: 97 F | BODY MASS INDEX: 35.1 KG/M2 | DIASTOLIC BLOOD PRESSURE: 64 MMHG

## 2024-02-14 DIAGNOSIS — G43.901 MIGRAINE WITH STATUS MIGRAINOSUS, NOT INTRACTABLE, UNSPECIFIED MIGRAINE TYPE: ICD-10-CM

## 2024-02-14 DIAGNOSIS — E78.5 HYPERLIPIDEMIA, UNSPECIFIED HYPERLIPIDEMIA TYPE: ICD-10-CM

## 2024-02-14 DIAGNOSIS — E66.9 OBESITY, UNSPECIFIED CLASSIFICATION, UNSPECIFIED OBESITY TYPE, UNSPECIFIED WHETHER SERIOUS COMORBIDITY PRESENT: ICD-10-CM

## 2024-02-14 DIAGNOSIS — K76.0 FATTY LIVER DISEASE, NONALCOHOLIC: ICD-10-CM

## 2024-02-14 DIAGNOSIS — E11.65 TYPE 2 DIABETES MELLITUS WITH HYPERGLYCEMIA, WITH LONG-TERM CURRENT USE OF INSULIN: Primary | ICD-10-CM

## 2024-02-14 DIAGNOSIS — Z79.4 TYPE 2 DIABETES MELLITUS WITH HYPERGLYCEMIA, WITH LONG-TERM CURRENT USE OF INSULIN: Primary | ICD-10-CM

## 2024-02-14 DIAGNOSIS — M54.50 LOW BACK PAIN WITHOUT SCIATICA, UNSPECIFIED BACK PAIN LATERALITY, UNSPECIFIED CHRONICITY: ICD-10-CM

## 2024-02-14 DIAGNOSIS — G43.809 OTHER MIGRAINE WITHOUT STATUS MIGRAINOSUS, NOT INTRACTABLE: ICD-10-CM

## 2024-02-14 PROCEDURE — 3052F HG A1C>EQUAL 8.0%<EQUAL 9.0%: CPT | Mod: CPTII,,, | Performed by: NURSE PRACTITIONER

## 2024-02-14 PROCEDURE — 1160F RVW MEDS BY RX/DR IN RCRD: CPT | Mod: CPTII,,, | Performed by: NURSE PRACTITIONER

## 2024-02-14 PROCEDURE — 4010F ACE/ARB THERAPY RXD/TAKEN: CPT | Mod: CPTII,,, | Performed by: NURSE PRACTITIONER

## 2024-02-14 PROCEDURE — 3061F NEG MICROALBUMINURIA REV: CPT | Mod: CPTII,,, | Performed by: NURSE PRACTITIONER

## 2024-02-14 PROCEDURE — 99214 OFFICE O/P EST MOD 30 MIN: CPT | Mod: ,,, | Performed by: NURSE PRACTITIONER

## 2024-02-14 PROCEDURE — 3078F DIAST BP <80 MM HG: CPT | Mod: CPTII,,, | Performed by: NURSE PRACTITIONER

## 2024-02-14 PROCEDURE — 3066F NEPHROPATHY DOC TX: CPT | Mod: CPTII,,, | Performed by: NURSE PRACTITIONER

## 2024-02-14 PROCEDURE — 3008F BODY MASS INDEX DOCD: CPT | Mod: CPTII,,, | Performed by: NURSE PRACTITIONER

## 2024-02-14 PROCEDURE — 1159F MED LIST DOCD IN RCRD: CPT | Mod: CPTII,,, | Performed by: NURSE PRACTITIONER

## 2024-02-14 PROCEDURE — 3074F SYST BP LT 130 MM HG: CPT | Mod: CPTII,,, | Performed by: NURSE PRACTITIONER

## 2024-02-14 RX ORDER — INSULIN GLARGINE 100 [IU]/ML
INJECTION, SOLUTION SUBCUTANEOUS
Qty: 1 EACH | Refills: 5 | Status: SHIPPED | OUTPATIENT
Start: 2024-02-14 | End: 2024-03-01 | Stop reason: SDUPTHER

## 2024-02-14 RX ORDER — TOPIRAMATE 50 MG/1
75 TABLET, FILM COATED ORAL NIGHTLY
Qty: 150 TABLET | Refills: 0 | Status: SHIPPED | OUTPATIENT
Start: 2024-02-14 | End: 2024-02-14 | Stop reason: SDUPTHER

## 2024-02-14 RX ORDER — ATORVASTATIN CALCIUM 80 MG/1
80 TABLET, FILM COATED ORAL DAILY
Qty: 90 TABLET | Refills: 3 | Status: SHIPPED | OUTPATIENT
Start: 2024-02-14 | End: 2024-02-14 | Stop reason: SDUPTHER

## 2024-02-14 RX ORDER — TIRZEPATIDE 5 MG/.5ML
5 INJECTION, SOLUTION SUBCUTANEOUS
Qty: 4 PEN | Refills: 0 | Status: SHIPPED | OUTPATIENT
Start: 2024-02-14 | End: 2024-02-14 | Stop reason: SDUPTHER

## 2024-02-14 RX ORDER — TOPIRAMATE 50 MG/1
75 TABLET, FILM COATED ORAL NIGHTLY
Qty: 150 TABLET | Refills: 0 | Status: SHIPPED | OUTPATIENT
Start: 2024-02-14

## 2024-02-14 RX ORDER — TIRZEPATIDE 5 MG/.5ML
5 INJECTION, SOLUTION SUBCUTANEOUS
Qty: 4 PEN | Refills: 0 | Status: SHIPPED | OUTPATIENT
Start: 2024-02-14 | End: 2024-03-01 | Stop reason: SDUPTHER

## 2024-02-14 RX ORDER — CYCLOBENZAPRINE HCL 5 MG
5 TABLET ORAL 3 TIMES DAILY PRN
Qty: 30 TABLET | Refills: 0 | Status: SHIPPED | OUTPATIENT
Start: 2024-02-14 | End: 2024-02-20 | Stop reason: SDUPTHER

## 2024-02-14 RX ORDER — ATORVASTATIN CALCIUM 80 MG/1
80 TABLET, FILM COATED ORAL DAILY
Qty: 90 TABLET | Refills: 3 | Status: SHIPPED | OUTPATIENT
Start: 2024-02-14

## 2024-02-14 NOTE — PROGRESS NOTES
Patient ID: [id number]     Chief Complaint: Follow-up (3 month ) and Results (Lab work)      HPI:     Ysabel Valerio is a 43 y.o. male here today for Follow-up (3 month ) and Results (Lab work)  He was last seen in the office on 05/19/2023.  His Ozempic was increased to 1 mg and he was advised to restart Jardiance 25 mg.  His Lantus was decreased back to 15 units nightly and he was told to stop the Lantus if his fasting blood glucose was less than 150.  His lab work was scheduled to be repeated in 3 months, which was around 8/18/23.    Instead, his labs were repeated on 02/02/2024 and he is here to discuss those results today.  A1c is down to 8.9 from 9.4 on 05/11/2023.    Past Medical History:  has a past medical history of Chest pain, Diabetes mellitus, type 2, Elevated liver enzymes, GERD (gastroesophageal reflux disease), Hyperlipidemia, Hypertension, Low back pain, Migraine, Obesity, unspecified, Right leg pain, and Seizures.    Surgical History:  has no past surgical history on file.    Family History: family history includes Hyperlipidemia in his father; Hypertension in his father and mother; Stroke in his sister.    Social History:  reports that he has been smoking cigarettes. He has a 3.8 pack-year smoking history. He has been exposed to tobacco smoke. He has never used smokeless tobacco. He reports current alcohol use. He reports that he does not use drugs.    Current Outpatient Medications   Medication Instructions    atorvastatin (LIPITOR) 80 mg, Oral, Daily    blood sugar diagnostic Strp 1 each, Misc.(Non-Drug; Combo Route), 2 times daily    cyclobenzaprine (FLEXERIL) 5 mg, Oral, 3 times daily PRN    empagliflozin (JARDIANCE) 25 mg, Oral, Daily    insulin (LANTUS SOLOSTAR U-100 INSULIN) glargine 100 units/mL SubQ pen Inject 25 units every day at bedtime and increase by 2 units every 3 days the fasting blood sugar is over 150.    insulin glargine 100 units/mL SubQ pen Inject 15 units every day at bedtime  "and increase by 2 units every 3 days the fasting blood sugar is over 150.    irbesartan (AVAPRO) 150 mg, Oral, Nightly    metFORMIN (GLUCOPHAGE-XR) 1,000 mg, Oral, Nightly    metoprolol succinate (TOPROL-XL) 50 mg, Oral, Daily    MOUNJARO 5 mg, Subcutaneous, Every 7 days    nabumetone (RELAFEN) 750 mg, Oral, 2 times daily PRN    pen needle, diabetic 32 gauge x 3/16" Ndle 1 each, Misc.(Non-Drug; Combo Route), 3 times daily    tirzepatide 7.5 mg, Subcutaneous, Every 7 days    [START ON 4/10/2024] tirzepatide 10 mg, Subcutaneous, Every 7 days    topiramate (TOPAMAX) 75 mg, Oral, Nightly       Patient has No Known Allergies.     Patient Care Team:  June Leone FNP-C as PCP - General (Family Medicine)  Nate Perez OD as Consulting Physician (Optometry)  [facility]  Eduar Ortega MD as Consulting Physician (Cardiology)       Subjective:     Review of Systems    See HPI     Objective:     Visit Vitals  /64 (BP Location: Right arm, Patient Position: Sitting, BP Method: Medium (Manual))   Pulse 100   Temp 96.8 °F (36 °C) (Temporal)   Ht 5' 7.01" (1.702 m)   Wt 101.4 kg (223 lb 9.6 oz)   SpO2 97%   BMI 35.01 kg/m²       Physical Exam  Vitals reviewed.   Constitutional:       Appearance: Normal appearance. He is obese.   Cardiovascular:      Rate and Rhythm: Normal rate and regular rhythm.      Heart sounds: Normal heart sounds.   Pulmonary:      Effort: Pulmonary effort is normal.      Breath sounds: Normal breath sounds.   Skin:     General: Skin is warm and dry.   Neurological:      Mental Status: He is alert and oriented to person, place, and time.   Psychiatric:         Mood and Affect: Mood normal.         Labs Reviewed:     Chemistry:  Lab Results   Component Value Date     02/02/2024    K 4.3 02/02/2024    CHLORIDE 103 02/02/2024    BUN 20.0 02/02/2024    CREATININE 1.13 02/02/2024    EGFRNORACEVR 82 02/02/2024    GLUCOSE 91 02/02/2024    CALCIUM 9.8 02/02/2024    " ALKPHOS 142 02/02/2024    LABPROT 8.2 02/02/2024    ALBUMIN 4.8 02/02/2024    AST 47 02/02/2024    ALT 65 (H) 02/02/2024    TSH 2.180 02/22/2023        Lab Results   Component Value Date    HGBA1C 8.9 (H) 02/02/2024        Hematology:  Lab Results   Component Value Date    WBC 10.57 02/02/2024    RBC 5.60 02/02/2024    HGB 16.6 02/02/2024    HCT 47.9 02/02/2024    MCV 85.5 02/02/2024    MCH 29.6 02/02/2024    MCHC 34.7 02/02/2024    RDW 12.0 02/02/2024     02/02/2024    MPV 11.2 02/02/2024       Lipid Panel:  Lab Results   Component Value Date    CHOL 148 02/02/2024    HDL 33 (L) 02/02/2024    LDL 73.2 08/18/2021    TRIG 266 (H) 02/02/2024        Assessment & Plan:     1. Type 2 diabetes mellitus with hyperglycemia, with long-term current use of insulin  Overview:  On Ozempic 1 mg, jardiance 25 mg, metformin 1000mg daily, Lantus 15 units daily.    Assessment & Plan:  Lab Results   Component Value Date    HGBA1C 8.9 (H) 02/02/2024      Stop ozempic, start mounjaro 5 mg weekly, cont jaridance, metformin, lantus.   Encouraged to follow ADA dietary guidelines for eating and implement exercise into daily regimen.  Encouraged compliance with both medications and diet to limit long term and negative effects from the disease.    Monitory glucose levels each morning and record.  Please bring for review at next appointment.      2. Hyperlipidemia, unspecified hyperlipidemia type  Overview:    On atorvastatin 40 mg daily    Assessment & Plan:  Lab Results   Component Value Date    CHOL 148 02/02/2024    HDL 33 (L) 02/02/2024    DLDL 78.2 02/02/2024    TRIG 266 (H) 02/02/2024   Increase atorvastatin to 80 mg, repeat labs in 3 months  LDL Goal: <70  Educated on dietary modifications. Follow a low cholesterol, low saturated fat diet with less that 200mg of cholesterol a day.  Avoid fried foods and high saturated fats.  Increase dietary fiber.  Regular exercise can reduce LDL (bad cholesterol) and raise HDL (good  cholesterol). Encourage physical activity 5 times per week for 30 minutes per day.       Orders:  -     atorvastatin (LIPITOR) 80 MG tablet; Take 1 tablet (80 mg total) by mouth once daily.  Dispense: 90 tablet; Refill: 3    3. Obesity, unspecified classification, unspecified obesity type, unspecified whether serious comorbidity present    4. Fatty liver disease, nonalcoholic  Overview:  6/2018 - US abdomen      5. Migraine with status migrainosus, not intractable, unspecified migraine type  Overview:  On topamax 50 mg, continue    Orders:  -     topiramate (TOPAMAX) 50 MG tablet; Take 1.5 tablets (75 mg total) by mouth every evening.  Dispense: 150 tablet; Refill: 0    6. Low back pain without sciatica, unspecified back pain laterality, unspecified chronicity  cyclobenzaprine (FLEXERIL) 5 MG tablet; Take 1 tablet (5 mg total) by mouth 3 (three) times daily as needed.  Dispense: 30 tablet; Refill: 0           Follow up for 1), 3 mo f/u, HLD, DM, fasting labs prior. In addition to their scheduled follow up, the patient has also been instructed to follow up on as needed basis.     Future Appointments   Date Time Provider Department Center   5/7/2024  9:30 AM LAB, [facility] LABORATORY DRAW STATION [place] MELANIE Brower   5/14/2024  1:00 PM June Leone, JU[facility]ARLOS[facility]AN[facility] GLORIA Brower

## 2024-02-14 NOTE — ASSESSMENT & PLAN NOTE
Lab Results   Component Value Date    CHOL 148 02/02/2024    HDL 33 (L) 02/02/2024    DLDL 78.2 02/02/2024    TRIG 266 (H) 02/02/2024   Increase atorvastatin to 80 mg, repeat labs in 3 months  LDL Goal: <70  Educated on dietary modifications. Follow a low cholesterol, low saturated fat diet with less that 200mg of cholesterol a day.  Avoid fried foods and high saturated fats.  Increase dietary fiber.  Regular exercise can reduce LDL (bad cholesterol) and raise HDL (good cholesterol). Encourage physical activity 5 times per week for 30 minutes per day.

## 2024-02-14 NOTE — ASSESSMENT & PLAN NOTE
Lab Results   Component Value Date    HGBA1C 8.9 (H) 02/02/2024      Continue medications without change.   Encouraged to follow ADA dietary guidelines for eating and implement exercise into daily regimen.  Encouraged compliance with both medications and diet to limit long term and negative effects from the disease.    Monitory glucose levels each morning and record.  Please bring for review at next appointment.

## 2024-02-20 DIAGNOSIS — M54.50 LOW BACK PAIN WITHOUT SCIATICA, UNSPECIFIED BACK PAIN LATERALITY, UNSPECIFIED CHRONICITY: ICD-10-CM

## 2024-02-20 DIAGNOSIS — E11.9 TYPE 2 DIABETES MELLITUS WITHOUT COMPLICATION, UNSPECIFIED WHETHER LONG TERM INSULIN USE: ICD-10-CM

## 2024-02-20 RX ORDER — NABUMETONE 750 MG/1
750 TABLET, FILM COATED ORAL 2 TIMES DAILY PRN
Qty: 60 TABLET | Refills: 1 | Status: SHIPPED | OUTPATIENT
Start: 2024-02-20

## 2024-02-20 RX ORDER — CYCLOBENZAPRINE HCL 5 MG
5 TABLET ORAL 3 TIMES DAILY PRN
Qty: 30 TABLET | Refills: 1 | Status: SHIPPED | OUTPATIENT
Start: 2024-02-20 | End: 2024-02-26 | Stop reason: SDUPTHER

## 2024-02-26 DIAGNOSIS — M54.50 LOW BACK PAIN WITHOUT SCIATICA, UNSPECIFIED BACK PAIN LATERALITY, UNSPECIFIED CHRONICITY: ICD-10-CM

## 2024-02-26 RX ORDER — CYCLOBENZAPRINE HCL 5 MG
5 TABLET ORAL 3 TIMES DAILY PRN
Qty: 30 TABLET | Refills: 1 | Status: SHIPPED | OUTPATIENT
Start: 2024-02-26

## 2024-03-01 DIAGNOSIS — E11.9 DIABETES MELLITUS WITHOUT COMPLICATION: ICD-10-CM

## 2024-03-01 DIAGNOSIS — Z79.4 TYPE 2 DIABETES MELLITUS WITH HYPERGLYCEMIA, WITH LONG-TERM CURRENT USE OF INSULIN: ICD-10-CM

## 2024-03-01 DIAGNOSIS — E11.65 TYPE 2 DIABETES MELLITUS WITH HYPERGLYCEMIA, WITH LONG-TERM CURRENT USE OF INSULIN: ICD-10-CM

## 2024-03-01 RX ORDER — TIRZEPATIDE 5 MG/.5ML
5 INJECTION, SOLUTION SUBCUTANEOUS
Qty: 4 PEN | Refills: 1 | Status: SHIPPED | OUTPATIENT
Start: 2024-03-01

## 2024-03-01 RX ORDER — INSULIN GLARGINE 100 [IU]/ML
INJECTION, SOLUTION SUBCUTANEOUS
Qty: 1 EACH | Refills: 1 | Status: SHIPPED | OUTPATIENT
Start: 2024-03-01 | End: 2024-05-22

## 2024-04-22 ENCOUNTER — TELEPHONE (OUTPATIENT)
Dept: FAMILY MEDICINE | Facility: CLINIC | Age: 44
End: 2024-04-22
Payer: MEDICAID

## 2024-04-29 ENCOUNTER — PATIENT MESSAGE (OUTPATIENT)
Dept: ADMINISTRATIVE | Facility: HOSPITAL | Age: 44
End: 2024-04-29
Payer: MEDICAID

## 2024-05-01 ENCOUNTER — TELEPHONE (OUTPATIENT)
Dept: FAMILY MEDICINE | Facility: CLINIC | Age: 44
End: 2024-05-01
Payer: MEDICAID

## 2024-05-22 ENCOUNTER — PATIENT MESSAGE (OUTPATIENT)
Dept: FAMILY MEDICINE | Facility: CLINIC | Age: 44
End: 2024-05-22
Payer: MEDICAID

## 2024-05-22 DIAGNOSIS — E11.9 DIABETES MELLITUS WITHOUT COMPLICATION: ICD-10-CM

## 2024-05-22 RX ORDER — INSULIN GLARGINE 100 [IU]/ML
INJECTION, SOLUTION SUBCUTANEOUS
Qty: 15 ML | Refills: 0 | Status: SHIPPED | OUTPATIENT
Start: 2024-05-22